# Patient Record
Sex: FEMALE | Race: WHITE | NOT HISPANIC OR LATINO | Employment: FULL TIME | URBAN - NONMETROPOLITAN AREA
[De-identification: names, ages, dates, MRNs, and addresses within clinical notes are randomized per-mention and may not be internally consistent; named-entity substitution may affect disease eponyms.]

---

## 2021-09-03 ENCOUNTER — HOSPITAL ENCOUNTER (EMERGENCY)
Facility: HOSPITAL | Age: 26
Discharge: HOME | End: 2021-09-03
Payer: COMMERCIAL

## 2021-09-03 VITALS
SYSTOLIC BLOOD PRESSURE: 150 MMHG | DIASTOLIC BLOOD PRESSURE: 89 MMHG | RESPIRATION RATE: 20 BRPM | HEIGHT: 67 IN | TEMPERATURE: 102 F | HEART RATE: 129 BPM | WEIGHT: 240 LBS | BODY MASS INDEX: 37.67 KG/M2 | OXYGEN SATURATION: 99 %

## 2021-09-03 DIAGNOSIS — Z20.822 CONTACT WITH AND (SUSPECTED) EXPOSURE TO COVID-19: ICD-10-CM

## 2021-09-03 DIAGNOSIS — R50.9 FEVER, UNSPECIFIED FEVER CAUSE: ICD-10-CM

## 2021-09-03 DIAGNOSIS — R06.02 SHORTNESS OF BREATH: Primary | ICD-10-CM

## 2021-09-03 PROCEDURE — 25000003 PHARM REV CODE 250: Performed by: NURSE PRACTITIONER

## 2021-09-03 PROCEDURE — 99282 EMERGENCY DEPT VISIT SF MDM: CPT | Mod: ,,, | Performed by: NURSE PRACTITIONER

## 2021-09-03 PROCEDURE — 99282 EMERGENCY DEPT VISIT SF MDM: CPT

## 2021-09-03 PROCEDURE — 99282 PR EMERGENCY DEPT VISIT,LEVEL II: ICD-10-PCS | Mod: ,,, | Performed by: NURSE PRACTITIONER

## 2021-09-03 RX ORDER — TRIPROLIDINE/PSEUDOEPHEDRINE 2.5MG-60MG
600 TABLET ORAL
Status: COMPLETED | OUTPATIENT
Start: 2021-09-03 | End: 2021-09-03

## 2021-09-03 RX ADMIN — IBUPROFEN 600 MG: 100 SUSPENSION ORAL at 06:09

## 2021-09-04 ENCOUNTER — TELEPHONE (OUTPATIENT)
Dept: EMERGENCY MEDICINE | Facility: HOSPITAL | Age: 26
End: 2021-09-04

## 2021-09-04 ENCOUNTER — INFUSION (OUTPATIENT)
Dept: INFECTIOUS DISEASES | Facility: HOSPITAL | Age: 26
End: 2021-09-04
Attending: NURSE PRACTITIONER
Payer: COMMERCIAL

## 2021-09-04 VITALS
SYSTOLIC BLOOD PRESSURE: 110 MMHG | OXYGEN SATURATION: 97 % | HEIGHT: 67 IN | HEART RATE: 101 BPM | TEMPERATURE: 99 F | RESPIRATION RATE: 18 BRPM | WEIGHT: 240 LBS | DIASTOLIC BLOOD PRESSURE: 80 MMHG | BODY MASS INDEX: 37.67 KG/M2

## 2021-09-04 DIAGNOSIS — U07.1 COVID-19: Primary | ICD-10-CM

## 2021-09-04 PROCEDURE — M0243 CASIRIVI AND IMDEVI INFUSION: HCPCS | Performed by: NURSE PRACTITIONER

## 2021-09-04 PROCEDURE — 25000003 PHARM REV CODE 250: Performed by: NURSE PRACTITIONER

## 2021-09-04 PROCEDURE — 63600175 PHARM REV CODE 636 W HCPCS: Performed by: NURSE PRACTITIONER

## 2021-09-04 RX ORDER — DIPHENHYDRAMINE HYDROCHLORIDE 50 MG/ML
25 INJECTION INTRAMUSCULAR; INTRAVENOUS ONCE AS NEEDED
Status: DISCONTINUED | OUTPATIENT
Start: 2021-09-04 | End: 2022-10-31

## 2021-09-04 RX ORDER — ONDANSETRON 4 MG/1
4 TABLET, ORALLY DISINTEGRATING ORAL ONCE AS NEEDED
Status: DISCONTINUED | OUTPATIENT
Start: 2021-09-04 | End: 2023-09-21

## 2021-09-04 RX ORDER — EPINEPHRINE 0.3 MG/.3ML
0.3 INJECTION SUBCUTANEOUS
Status: DISCONTINUED | OUTPATIENT
Start: 2021-09-04 | End: 2023-09-21

## 2021-09-04 RX ORDER — ACETAMINOPHEN 325 MG/1
650 TABLET ORAL ONCE AS NEEDED
Status: DISCONTINUED | OUTPATIENT
Start: 2021-09-04 | End: 2023-09-21

## 2021-09-04 RX ORDER — ALBUTEROL SULFATE 90 UG/1
2 AEROSOL, METERED RESPIRATORY (INHALATION)
Status: DISCONTINUED | OUTPATIENT
Start: 2021-09-04 | End: 2023-09-21

## 2021-09-04 RX ORDER — SODIUM CHLORIDE 0.9 % (FLUSH) 0.9 %
10 SYRINGE (ML) INJECTION
Status: DISCONTINUED | OUTPATIENT
Start: 2021-09-04 | End: 2023-09-21

## 2021-09-04 RX ADMIN — SODIUM CHLORIDE 600 MG: 9 INJECTION, SOLUTION INTRAVENOUS at 06:09

## 2021-09-07 ENCOUNTER — TELEPHONE (OUTPATIENT)
Dept: INFECTIOUS DISEASES | Facility: HOSPITAL | Age: 26
End: 2021-09-07

## 2022-09-09 ENCOUNTER — OFFICE VISIT (OUTPATIENT)
Dept: PRIMARY CARE CLINIC | Facility: CLINIC | Age: 27
End: 2022-09-09
Payer: COMMERCIAL

## 2022-09-09 VITALS
BODY MASS INDEX: 38.19 KG/M2 | RESPIRATION RATE: 18 BRPM | SYSTOLIC BLOOD PRESSURE: 127 MMHG | HEIGHT: 68 IN | TEMPERATURE: 98 F | WEIGHT: 252 LBS | DIASTOLIC BLOOD PRESSURE: 92 MMHG | OXYGEN SATURATION: 100 % | HEART RATE: 92 BPM

## 2022-09-09 DIAGNOSIS — F32.A ANXIETY AND DEPRESSION: Primary | ICD-10-CM

## 2022-09-09 DIAGNOSIS — F99 INSOMNIA DUE TO OTHER MENTAL DISORDER: ICD-10-CM

## 2022-09-09 DIAGNOSIS — F51.05 INSOMNIA DUE TO OTHER MENTAL DISORDER: ICD-10-CM

## 2022-09-09 DIAGNOSIS — F41.9 ANXIETY AND DEPRESSION: Primary | ICD-10-CM

## 2022-09-09 DIAGNOSIS — G90.A POTS (POSTURAL ORTHOSTATIC TACHYCARDIA SYNDROME): ICD-10-CM

## 2022-09-09 DIAGNOSIS — G43.909 MIGRAINE WITHOUT STATUS MIGRAINOSUS, NOT INTRACTABLE, UNSPECIFIED MIGRAINE TYPE: ICD-10-CM

## 2022-09-09 PROBLEM — Q79.62 EHLERS-DANLOS SYNDROME TYPE III: Status: ACTIVE | Noted: 2020-01-06

## 2022-09-09 LAB
T4 FREE SERPL-MCNC: 1.11 NG/DL (ref 0.76–1.46)
TSH SERPL DL<=0.005 MIU/L-ACNC: 2.19 UIU/ML (ref 0.36–3.74)

## 2022-09-09 PROCEDURE — 99214 PR OFFICE/OUTPT VISIT, EST, LEVL IV, 30-39 MIN: ICD-10-PCS | Mod: ,,, | Performed by: STUDENT IN AN ORGANIZED HEALTH CARE EDUCATION/TRAINING PROGRAM

## 2022-09-09 PROCEDURE — 3080F DIAST BP >= 90 MM HG: CPT | Mod: ,,, | Performed by: STUDENT IN AN ORGANIZED HEALTH CARE EDUCATION/TRAINING PROGRAM

## 2022-09-09 PROCEDURE — 84443 ASSAY THYROID STIM HORMONE: CPT | Mod: ,,, | Performed by: CLINICAL MEDICAL LABORATORY

## 2022-09-09 PROCEDURE — 1160F RVW MEDS BY RX/DR IN RCRD: CPT | Mod: ,,, | Performed by: STUDENT IN AN ORGANIZED HEALTH CARE EDUCATION/TRAINING PROGRAM

## 2022-09-09 PROCEDURE — 3074F PR MOST RECENT SYSTOLIC BLOOD PRESSURE < 130 MM HG: ICD-10-PCS | Mod: ,,, | Performed by: STUDENT IN AN ORGANIZED HEALTH CARE EDUCATION/TRAINING PROGRAM

## 2022-09-09 PROCEDURE — 84443 TSH: ICD-10-PCS | Mod: ,,, | Performed by: CLINICAL MEDICAL LABORATORY

## 2022-09-09 PROCEDURE — 84439 T4, FREE: ICD-10-PCS | Mod: ,,, | Performed by: CLINICAL MEDICAL LABORATORY

## 2022-09-09 PROCEDURE — 3008F PR BODY MASS INDEX (BMI) DOCUMENTED: ICD-10-PCS | Mod: ,,, | Performed by: STUDENT IN AN ORGANIZED HEALTH CARE EDUCATION/TRAINING PROGRAM

## 2022-09-09 PROCEDURE — 1159F MED LIST DOCD IN RCRD: CPT | Mod: ,,, | Performed by: STUDENT IN AN ORGANIZED HEALTH CARE EDUCATION/TRAINING PROGRAM

## 2022-09-09 PROCEDURE — 99214 OFFICE O/P EST MOD 30 MIN: CPT | Mod: ,,, | Performed by: STUDENT IN AN ORGANIZED HEALTH CARE EDUCATION/TRAINING PROGRAM

## 2022-09-09 PROCEDURE — 3080F PR MOST RECENT DIASTOLIC BLOOD PRESSURE >= 90 MM HG: ICD-10-PCS | Mod: ,,, | Performed by: STUDENT IN AN ORGANIZED HEALTH CARE EDUCATION/TRAINING PROGRAM

## 2022-09-09 PROCEDURE — 3008F BODY MASS INDEX DOCD: CPT | Mod: ,,, | Performed by: STUDENT IN AN ORGANIZED HEALTH CARE EDUCATION/TRAINING PROGRAM

## 2022-09-09 PROCEDURE — 84439 ASSAY OF FREE THYROXINE: CPT | Mod: ,,, | Performed by: CLINICAL MEDICAL LABORATORY

## 2022-09-09 PROCEDURE — 3074F SYST BP LT 130 MM HG: CPT | Mod: ,,, | Performed by: STUDENT IN AN ORGANIZED HEALTH CARE EDUCATION/TRAINING PROGRAM

## 2022-09-09 PROCEDURE — 1160F PR REVIEW ALL MEDS BY PRESCRIBER/CLIN PHARMACIST DOCUMENTED: ICD-10-PCS | Mod: ,,, | Performed by: STUDENT IN AN ORGANIZED HEALTH CARE EDUCATION/TRAINING PROGRAM

## 2022-09-09 PROCEDURE — 1159F PR MEDICATION LIST DOCUMENTED IN MEDICAL RECORD: ICD-10-PCS | Mod: ,,, | Performed by: STUDENT IN AN ORGANIZED HEALTH CARE EDUCATION/TRAINING PROGRAM

## 2022-09-09 RX ORDER — SERTRALINE HYDROCHLORIDE 25 MG/1
25 TABLET, FILM COATED ORAL DAILY
Qty: 30 TABLET | Refills: 1 | Status: SHIPPED | OUTPATIENT
Start: 2022-09-09 | End: 2022-09-29

## 2022-09-09 RX ORDER — PROPRANOLOL HYDROCHLORIDE 20 MG/1
20 TABLET ORAL DAILY
Qty: 30 TABLET | Refills: 1 | Status: SHIPPED | OUTPATIENT
Start: 2022-09-09 | End: 2022-11-21 | Stop reason: SDUPTHER

## 2022-09-09 RX ORDER — SERTRALINE HYDROCHLORIDE 25 MG/1
25 TABLET, FILM COATED ORAL DAILY
Qty: 30 TABLET | Refills: 0 | Status: SHIPPED | OUTPATIENT
Start: 2022-09-09 | End: 2022-09-09

## 2022-09-09 RX ORDER — TOPIRAMATE 25 MG/1
75 TABLET ORAL 2 TIMES DAILY
Qty: 540 TABLET | Refills: 1 | Status: SHIPPED | OUTPATIENT
Start: 2022-09-09 | End: 2023-04-26

## 2022-09-09 RX ORDER — TRAZODONE HYDROCHLORIDE 50 MG/1
50 TABLET ORAL NIGHTLY PRN
Qty: 30 TABLET | Refills: 1 | Status: SHIPPED | OUTPATIENT
Start: 2022-09-09 | End: 2022-09-29

## 2022-09-09 NOTE — PROGRESS NOTES
"Subjective:      Zora Becerra is a 26 y.o.female who presents to clinic for Anxiety, Depression, Migraine, and Insomnia    Patient has been dealing with anxiety and depression since she was 14 years and again when she was 24. Her most recent stressor was her parents . She also has terrible "medical anxiety" with regards to her partner. She states that she panics and jumps to worst case scenario. 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 She used to see a Psychiatrist about 3-4 years ago. She is concerned because it is affected her sleep, work, and relationship. She was unable to continue to see her psychiatrist because of financial problems. She states she is going to have to file for bankruptcy.     She also has problems with insomnia. She states she uses bilateral hearing aids and when she takes it out at night, she gets very anxious that something is happening, but she can't hear what's going on. She has problems with both sleep onset and sleep maintenance. She states sometimes she will be awake from 11pm/12pm to 5am. She took melatonin and benadryl without relief last night. Denies SI/HI/AVH.     Per chart review and patient report: she has been on buspar as needed, zoloft (used to work, but she took it last week and felt like she was jumping out of her skin), topamax, doxepin, lexapro (felt like she was having a seizure), klonipin, and melatonin for her mood and insomnia. She is also complaining of headaches, decreased energy, and excess hair loss.     She has a history of migraines for which she was on topamax as well as seizures that she was on keppra for. She self discontinued due to finances and because she said they did an EEG and they didn't see any seizure activity. Has family history of epilepsy.     She also has a history of POTS that was treated with propranolol 20mg daily. Last time she saw " Cardiology was in 2020.       EMILY-7 anxiety scale    Not at all Several days More than half the days Nearly every day   Over the last 2 weeks, how often have you been bothered by the following problems?   1. Feeling nervous, anxious, or on edge 0 1 2 3   2. Not being able to stop or control worrying 0 1 2 3   3. Worrying too much about different things 0 1 2 3   4. Trouble relaxing 0 1 2 3   5. Being so restless that it is hard to sit still 0 1 2 3   6. Becoming easily annoyed or irritable 0 1 2 3   7. Feeling afraid as if something awful might happen 0 1 2 3     Score 19 - severe anxiety    PHQ-9 depression questionnaire  Over the last 2 weeks, how often have you been bothered by any of the following problems? Not at all Several days More than half the days Nearly every day   Little interest or pleasure in doing things 0 1 2 3   Feeling down, depressed, or hopeless 0 1 2 3   Trouble falling or staying asleep, or sleeping too much 0 1 2 3   Feeling tired or having little energy 0 1 2 3   Poor appetite or overeating 0 1 2 3   Feeling bad about yourself, or that you are a failure, or that you have let yourself or your family down 0 1 2 3   Trouble concentrating on things, such as reading the newspaper or watching television 0 1 2 3   Moving or speaking so slowly that other people could have noticed? Or the opposite, being so fidgety or restless that you have been moving around a lot more than usual. 0 1 2 3   Thoughts that you would be better off dead, or of hurting yourself in some way 0 1 2 3     PHQ9 19 - moderately severe      ROS as above    Past Medical History:  has a past medical history of Asthma and Pott's disease.   Past Surgical History:  has a past surgical history that includes Cholecystectomy and Tonsillectomy.  Family History: family history is not on file.  Social History:  reports that she has never smoked. She has never used smokeless tobacco. She reports that she does not drink alcohol and does not  use drugs.  Allergies:   Review of patient's allergies indicates:   Allergen Reactions    Dexamethasone Other (See Comments)     HR > 200      Latex     Penicillins Rash       Objective:     Vitals:    09/09/22 1011   BP: (!) 127/92   Pulse: 92   Resp: 18   Temp: 98.2 °F (36.8 °C)     Physical Exam  Vitals reviewed.   Constitutional:       General: She is not in acute distress.     Appearance: Normal appearance. She is not ill-appearing, toxic-appearing or diaphoretic.   HENT:      Head: Normocephalic and atraumatic.      Right Ear: External ear normal.      Left Ear: External ear normal.   Eyes:      General: No scleral icterus.        Right eye: No discharge.         Left eye: No discharge.      Conjunctiva/sclera: Conjunctivae normal.   Cardiovascular:      Rate and Rhythm: Normal rate and regular rhythm.      Pulses: Normal pulses.   Pulmonary:      Effort: Pulmonary effort is normal. No respiratory distress.      Breath sounds: Normal breath sounds. No wheezing.   Neurological:      General: No focal deficit present.      Mental Status: She is alert.   Psychiatric:         Attention and Perception: Attention normal.         Speech: Speech normal.         Behavior: Behavior normal.         Thought Content: Thought content normal.        Assessment/Plan:     1. Anxiety and depression  - will r/o thyroid dysfunction due to anxiety, depression, and excess hair loss  - restart zoloft daily  - TSH; Future  - T4, Free; Future  - sertraline (ZOLOFT) 25 MG tablet; Take 1 tablet (25 mg total) by mouth once daily.  Dispense: 30 tablet; Refill: 1  - TSH  - T4, Free    2. Migraine without status migrainosus, not intractable, unspecified migraine type  - restart topamax  - topiramate (TOPAMAX) 25 MG tablet; Take 3 tablets (75 mg total) by mouth 2 (two) times daily.  Dispense: 540 tablet; Refill: 1    3. Insomnia due to other mental disorder  - short trial of trazodone for insomnia  - traZODone (DESYREL) 50 MG tablet; Take 1  tablet (50 mg total) by mouth nightly as needed for Insomnia.  Dispense: 30 tablet; Refill: 1    4. POTS (postural orthostatic tachycardia syndrome)  - restart propranolol  - propranoloL (INDERAL) 20 MG tablet; Take 1 tablet (20 mg total) by mouth once daily.  Dispense: 30 tablet; Refill: 1        Return to clinic in 1 month for f/u mood or sooner if needed.     Zoey Driscoll MD  Family Medicine  09/09/2022

## 2022-09-29 ENCOUNTER — OFFICE VISIT (OUTPATIENT)
Dept: PRIMARY CARE CLINIC | Facility: CLINIC | Age: 27
End: 2022-09-29
Payer: COMMERCIAL

## 2022-09-29 VITALS
HEIGHT: 68 IN | RESPIRATION RATE: 18 BRPM | OXYGEN SATURATION: 100 % | WEIGHT: 245 LBS | HEART RATE: 70 BPM | SYSTOLIC BLOOD PRESSURE: 122 MMHG | BODY MASS INDEX: 37.13 KG/M2 | DIASTOLIC BLOOD PRESSURE: 86 MMHG

## 2022-09-29 DIAGNOSIS — G47.00 INSOMNIA, UNSPECIFIED TYPE: ICD-10-CM

## 2022-09-29 DIAGNOSIS — F32.A ANXIETY AND DEPRESSION: Primary | ICD-10-CM

## 2022-09-29 DIAGNOSIS — F41.9 ANXIETY AND DEPRESSION: Primary | ICD-10-CM

## 2022-09-29 PROCEDURE — 3008F BODY MASS INDEX DOCD: CPT | Mod: ,,, | Performed by: STUDENT IN AN ORGANIZED HEALTH CARE EDUCATION/TRAINING PROGRAM

## 2022-09-29 PROCEDURE — 3008F PR BODY MASS INDEX (BMI) DOCUMENTED: ICD-10-PCS | Mod: ,,, | Performed by: STUDENT IN AN ORGANIZED HEALTH CARE EDUCATION/TRAINING PROGRAM

## 2022-09-29 PROCEDURE — 1159F MED LIST DOCD IN RCRD: CPT | Mod: ,,, | Performed by: STUDENT IN AN ORGANIZED HEALTH CARE EDUCATION/TRAINING PROGRAM

## 2022-09-29 PROCEDURE — 99214 PR OFFICE/OUTPT VISIT, EST, LEVL IV, 30-39 MIN: ICD-10-PCS | Mod: ,,, | Performed by: STUDENT IN AN ORGANIZED HEALTH CARE EDUCATION/TRAINING PROGRAM

## 2022-09-29 PROCEDURE — 99214 OFFICE O/P EST MOD 30 MIN: CPT | Mod: ,,, | Performed by: STUDENT IN AN ORGANIZED HEALTH CARE EDUCATION/TRAINING PROGRAM

## 2022-09-29 PROCEDURE — 3074F PR MOST RECENT SYSTOLIC BLOOD PRESSURE < 130 MM HG: ICD-10-PCS | Mod: ,,, | Performed by: STUDENT IN AN ORGANIZED HEALTH CARE EDUCATION/TRAINING PROGRAM

## 2022-09-29 PROCEDURE — 3079F PR MOST RECENT DIASTOLIC BLOOD PRESSURE 80-89 MM HG: ICD-10-PCS | Mod: ,,, | Performed by: STUDENT IN AN ORGANIZED HEALTH CARE EDUCATION/TRAINING PROGRAM

## 2022-09-29 PROCEDURE — 3079F DIAST BP 80-89 MM HG: CPT | Mod: ,,, | Performed by: STUDENT IN AN ORGANIZED HEALTH CARE EDUCATION/TRAINING PROGRAM

## 2022-09-29 PROCEDURE — 1159F PR MEDICATION LIST DOCUMENTED IN MEDICAL RECORD: ICD-10-PCS | Mod: ,,, | Performed by: STUDENT IN AN ORGANIZED HEALTH CARE EDUCATION/TRAINING PROGRAM

## 2022-09-29 PROCEDURE — 3074F SYST BP LT 130 MM HG: CPT | Mod: ,,, | Performed by: STUDENT IN AN ORGANIZED HEALTH CARE EDUCATION/TRAINING PROGRAM

## 2022-09-29 RX ORDER — DOXEPIN HYDROCHLORIDE 10 MG/1
10 CAPSULE ORAL NIGHTLY
Qty: 30 CAPSULE | Refills: 1 | Status: SHIPPED | OUTPATIENT
Start: 2022-09-29 | End: 2023-04-26

## 2022-09-29 RX ORDER — SERTRALINE HYDROCHLORIDE 50 MG/1
50 TABLET, FILM COATED ORAL DAILY
Qty: 30 TABLET | Refills: 1 | Status: SHIPPED | OUTPATIENT
Start: 2022-09-29 | End: 2023-04-26

## 2022-09-29 NOTE — PROGRESS NOTES
Subjective:      Zora Becerra is a 26 y.o.female who presents to clinic for Follow-up      Patient presents for f/u anxiety and depression. She states her mood is better, but she is still feeling tired. She doesn't have the get up and go. She used to wake up and want to do her hair and makeup, but she doesn't want to do that anymore. She has backed off of the trazodone and only takes it when she feels like she can't fall asleep. She said she can fall asleep easier now, but can't stay asleep and doesn't feel rested when she wakes up. She wakes up multiple times throughout the night. She says that she only gets 4-5 hours of sleep though she goes to bed around 9/10pm and wakes up at 6am. Periods are irregular and heavy (she states that she bled through 3 pads in 4 hours once). LMP: sometime last week, but only lasted about 4 days. Denies SI/HI/AVH. She states her relationship with her significant other is doing better since she is starting to feel less anxious about things.     EMILY-7 anxiety scale    Not at all Several days More than half the days Nearly every day   Over the last 2 weeks, how often have you been bothered by the following problems?   1. Feeling nervous, anxious, or on edge 0 1 2 3   2. Not being able to stop or control worrying 0 1 2 3   3. Worrying too much about different things 0 1 2 3   4. Trouble relaxing 0 1 2 3   5. Being so restless that it is hard to sit still 0 1 2 3   6. Becoming easily annoyed or irritable 0 1 2 3   7. Feeling afraid as if something awful might happen 0 1 2 3     Score 13    PHQ-9 depression questionnaire  Over the last 2 weeks, how often have you been bothered by any of the following problems? Not at all Several days More than half the days Nearly every day   Little interest or pleasure in doing things 0 1 2 3   Feeling down, depressed, or hopeless 0 1 2 3   Trouble falling or staying asleep, or sleeping too much 0 1 2 3   Feeling tired or having little energy 0 1 2 3    Poor appetite or overeating 0 1 2 3   Feeling bad about yourself, or that you are a failure, or that you have let yourself or your family down 0 1 2 3   Trouble concentrating on things, such as reading the newspaper or watching television 0 1 2 3   Moving or speaking so slowly that other people could have noticed? Or the opposite, being so fidgety or restless that you have been moving around a lot more than usual. 0 1 2 3   Thoughts that you would be better off dead, or of hurting yourself in some way 0 1 2 3     Score 16      ROS as above    Past Medical History:  has a past medical history of Asthma and Pott's disease.   Past Surgical History:  has a past surgical history that includes Cholecystectomy and Tonsillectomy.  Family History: family history is not on file.  Social History:  reports that she has never smoked. She has never used smokeless tobacco. She reports that she does not drink alcohol and does not use drugs.  Allergies:   Review of patient's allergies indicates:   Allergen Reactions    Dexamethasone Other (See Comments)     HR > 200      Latex     Penicillins Rash       Objective:     Vitals:    09/29/22 1608   BP: 122/86   Pulse: 70   Resp: 18     Physical Exam  Vitals reviewed.   Constitutional:       General: She is not in acute distress.     Appearance: Normal appearance. She is not ill-appearing, toxic-appearing or diaphoretic.   HENT:      Head: Normocephalic and atraumatic.      Right Ear: External ear normal.      Left Ear: External ear normal.   Eyes:      General: No scleral icterus.        Right eye: No discharge.         Left eye: No discharge.   Pulmonary:      Effort: Pulmonary effort is normal. No respiratory distress.   Neurological:      Mental Status: She is alert.   Psychiatric:         Attention and Perception: Attention normal.         Speech: Speech normal.         Behavior: Behavior normal.         Thought Content: Thought content normal.        Assessment/Plan:     1.  Anxiety and depression  - PHQ9 and GAD7 have improved from a score of 19 at our last visit  - increase zoloft to 50mg daily  - sertraline (ZOLOFT) 50 MG tablet; Take 1 tablet (50 mg total) by mouth once daily.  Dispense: 30 tablet; Refill: 1    2. Insomnia, unspecified type  - discontinue trazodone and will do trial of doxepin for sleep maintenance insomnia  - doxepin (SINEQUAN) 10 MG capsule; Take 1 capsule (10 mg total) by mouth every evening.  Dispense: 30 capsule; Refill: 1      Return to clinic in 1 month for f/u mood or sooner if needed.     Zoey Driscoll MD  Family Medicine  09/29/2022

## 2022-10-31 ENCOUNTER — OFFICE VISIT (OUTPATIENT)
Dept: PRIMARY CARE CLINIC | Facility: CLINIC | Age: 27
End: 2022-10-31
Payer: COMMERCIAL

## 2022-10-31 VITALS
RESPIRATION RATE: 18 BRPM | HEART RATE: 87 BPM | BODY MASS INDEX: 38.04 KG/M2 | DIASTOLIC BLOOD PRESSURE: 103 MMHG | WEIGHT: 251 LBS | SYSTOLIC BLOOD PRESSURE: 140 MMHG | TEMPERATURE: 99 F | OXYGEN SATURATION: 96 % | HEIGHT: 68 IN

## 2022-10-31 DIAGNOSIS — J01.90 ACUTE NON-RECURRENT SINUSITIS, UNSPECIFIED LOCATION: Primary | ICD-10-CM

## 2022-10-31 LAB
CTP QC/QA: YES
CTP QC/QA: YES
FLUAV AG NPH QL: NEGATIVE
FLUBV AG NPH QL: NEGATIVE
S PYO RRNA THROAT QL PROBE: NEGATIVE
SARS-COV-2 AG RESP QL IA.RAPID: NEGATIVE

## 2022-10-31 PROCEDURE — 99213 OFFICE O/P EST LOW 20 MIN: CPT | Mod: 25,,, | Performed by: STUDENT IN AN ORGANIZED HEALTH CARE EDUCATION/TRAINING PROGRAM

## 2022-10-31 PROCEDURE — 99213 PR OFFICE/OUTPT VISIT, EST, LEVL III, 20-29 MIN: ICD-10-PCS | Mod: 25,,, | Performed by: STUDENT IN AN ORGANIZED HEALTH CARE EDUCATION/TRAINING PROGRAM

## 2022-10-31 PROCEDURE — 1159F PR MEDICATION LIST DOCUMENTED IN MEDICAL RECORD: ICD-10-PCS | Mod: ,,, | Performed by: STUDENT IN AN ORGANIZED HEALTH CARE EDUCATION/TRAINING PROGRAM

## 2022-10-31 PROCEDURE — 87880 STREP A ASSAY W/OPTIC: CPT | Mod: QW,,, | Performed by: STUDENT IN AN ORGANIZED HEALTH CARE EDUCATION/TRAINING PROGRAM

## 2022-10-31 PROCEDURE — 1159F MED LIST DOCD IN RCRD: CPT | Mod: ,,, | Performed by: STUDENT IN AN ORGANIZED HEALTH CARE EDUCATION/TRAINING PROGRAM

## 2022-10-31 PROCEDURE — 3077F SYST BP >= 140 MM HG: CPT | Mod: ,,, | Performed by: STUDENT IN AN ORGANIZED HEALTH CARE EDUCATION/TRAINING PROGRAM

## 2022-10-31 PROCEDURE — 87428 POCT SARS-COV2 (COVID) WITH FLU ANTIGEN: ICD-10-PCS | Mod: QW,,, | Performed by: STUDENT IN AN ORGANIZED HEALTH CARE EDUCATION/TRAINING PROGRAM

## 2022-10-31 PROCEDURE — 87880 POCT RAPID STREP A: ICD-10-PCS | Mod: QW,,, | Performed by: STUDENT IN AN ORGANIZED HEALTH CARE EDUCATION/TRAINING PROGRAM

## 2022-10-31 PROCEDURE — 96372 PR INJECTION,THERAP/PROPH/DIAG2ST, IM OR SUBCUT: ICD-10-PCS | Mod: ,,, | Performed by: STUDENT IN AN ORGANIZED HEALTH CARE EDUCATION/TRAINING PROGRAM

## 2022-10-31 PROCEDURE — 96372 THER/PROPH/DIAG INJ SC/IM: CPT | Mod: ,,, | Performed by: STUDENT IN AN ORGANIZED HEALTH CARE EDUCATION/TRAINING PROGRAM

## 2022-10-31 PROCEDURE — 3077F PR MOST RECENT SYSTOLIC BLOOD PRESSURE >= 140 MM HG: ICD-10-PCS | Mod: ,,, | Performed by: STUDENT IN AN ORGANIZED HEALTH CARE EDUCATION/TRAINING PROGRAM

## 2022-10-31 PROCEDURE — 3080F PR MOST RECENT DIASTOLIC BLOOD PRESSURE >= 90 MM HG: ICD-10-PCS | Mod: ,,, | Performed by: STUDENT IN AN ORGANIZED HEALTH CARE EDUCATION/TRAINING PROGRAM

## 2022-10-31 PROCEDURE — 3080F DIAST BP >= 90 MM HG: CPT | Mod: ,,, | Performed by: STUDENT IN AN ORGANIZED HEALTH CARE EDUCATION/TRAINING PROGRAM

## 2022-10-31 PROCEDURE — 87428 SARSCOV & INF VIR A&B AG IA: CPT | Mod: QW,,, | Performed by: STUDENT IN AN ORGANIZED HEALTH CARE EDUCATION/TRAINING PROGRAM

## 2022-10-31 RX ORDER — CEFTRIAXONE 1 G/1
1 INJECTION, POWDER, FOR SOLUTION INTRAMUSCULAR; INTRAVENOUS
Status: COMPLETED | OUTPATIENT
Start: 2022-10-31 | End: 2022-10-31

## 2022-10-31 RX ORDER — AMOXICILLIN AND CLAVULANATE POTASSIUM 875; 125 MG/1; MG/1
1 TABLET, FILM COATED ORAL EVERY 12 HOURS
Qty: 14 TABLET | Refills: 0 | Status: SHIPPED | OUTPATIENT
Start: 2022-11-01 | End: 2022-11-08

## 2022-10-31 RX ORDER — BENZONATATE 200 MG/1
200 CAPSULE ORAL 3 TIMES DAILY PRN
Qty: 30 CAPSULE | Refills: 0 | Status: SHIPPED | OUTPATIENT
Start: 2022-10-31 | End: 2022-11-10

## 2022-10-31 RX ADMIN — CEFTRIAXONE 1 G: 1 INJECTION, POWDER, FOR SOLUTION INTRAMUSCULAR; INTRAVENOUS at 04:10

## 2022-10-31 NOTE — PROGRESS NOTES
Subjective:      Zora Becerra is a 27 y.o.female who presents to clinic for Cough, Fever, Sore Throat, Nasal Congestion, and Generalized Body Aches    Symptoms began last night and include: fever (Tm 101.2F), sore throat, cough, nasal congestion, rhinorrhea, myalgias, malaise, and shortness of breath with exertion. She states a kid vomited on her on Friday and she started feeling puny over the weekend. She has been taking ibuprofen, tylenol, aleve, and dayquil for her symptoms.       ROS as above    Past Medical History:  has a past medical history of Asthma and Pott's disease.   Past Surgical History:  has a past surgical history that includes Cholecystectomy and Tonsillectomy.  Family History: family history is not on file.  Social History:  reports that she has never smoked. She has never used smokeless tobacco. She reports that she does not drink alcohol and does not use drugs.  Allergies:   Review of patient's allergies indicates:   Allergen Reactions    Dexamethasone Other (See Comments)     HR > 200      Latex     Penicillins Rash       Objective:     Vitals:    10/31/22 1555   BP: (!) 140/103   Pulse: 87   Resp: 18   Temp: 99.1 °F (37.3 °C)     Physical Exam  Vitals reviewed.   Constitutional:       General: She is not in acute distress.     Appearance: Normal appearance. She is ill-appearing. She is not toxic-appearing or diaphoretic.   HENT:      Head: Normocephalic and atraumatic.      Right Ear: Tympanic membrane, ear canal and external ear normal.      Left Ear: Tympanic membrane, ear canal and external ear normal.      Nose: Congestion present.      Right Turbinates: Enlarged and swollen.      Left Turbinates: Enlarged and swollen.      Right Sinus: Maxillary sinus tenderness and frontal sinus tenderness present.      Left Sinus: Maxillary sinus tenderness and frontal sinus tenderness present.      Mouth/Throat:      Mouth: Mucous membranes are moist.      Pharynx: Posterior oropharyngeal erythema  present. No oropharyngeal exudate.   Eyes:      General: No scleral icterus.        Right eye: No discharge.         Left eye: No discharge.   Cardiovascular:      Rate and Rhythm: Normal rate and regular rhythm.      Pulses: Normal pulses.      Heart sounds: Normal heart sounds. No murmur heard.  Pulmonary:      Effort: Pulmonary effort is normal. No respiratory distress.      Breath sounds: Normal breath sounds. No wheezing, rhonchi or rales.   Skin:     General: Skin is warm.   Neurological:      General: No focal deficit present.      Mental Status: She is alert.   Psychiatric:         Behavior: Behavior normal.          Assessment/Plan:     1. Acute non-recurrent sinusitis, unspecified location  - COVID, flu, and strep negative  - POCT SARS-COV2 (COVID) with Flu Antigen  - POCT rapid strep A  - cefTRIAXone injection 1 g  - amoxicillin-clavulanate 875-125mg (AUGMENTIN) 875-125 mg per tablet; Take 1 tablet by mouth every 12 (twelve) hours. for 7 days  Dispense: 14 tablet; Refill: 0  - chlorpheniramine-dextromethorp 1-7.5 mg/5 mL Liqd; Take 10 mLs by mouth every 6 (six) hours as needed (cough and congestion).  Dispense: 118 mL; Refill: 0  - benzonatate (TESSALON) 200 MG capsule; Take 1 capsule (200 mg total) by mouth 3 (three) times daily as needed for Cough.  Dispense: 30 capsule; Refill: 0        Return to clinic if symptoms persist/worsen and as needed.     Zoey Driscoll MD  Family Medicine  10/31/2022

## 2022-10-31 NOTE — LETTER
October 31, 2022      [facility]  [address] [facility] [place] [address]  Phone: [phone]  Fax: [phone]       Patient: Zora Becerra   YOB: 1995  Date of Visit: 10/31/2022    To Whom It May Concern:      Carlos Becerra  was at [facility] on 10/31/2022. The patient may return to work/school on 11/2/22 as long as she remains fever free without the use of fever reducing medications for 24 hours. If you have any questions or concerns, or if I can be of further assistance, please do not hesitate to contact me.      Sincerely,    Zoey Driscoll MD      Last seen: 06/10/2019  Last filled: 06/10/2019 Losartan HCT  Upcoming apt: none

## 2022-11-21 ENCOUNTER — OFFICE VISIT (OUTPATIENT)
Dept: PRIMARY CARE CLINIC | Facility: CLINIC | Age: 27
End: 2022-11-21
Payer: COMMERCIAL

## 2022-11-21 VITALS
HEART RATE: 86 BPM | TEMPERATURE: 98 F | BODY MASS INDEX: 44.83 KG/M2 | RESPIRATION RATE: 18 BRPM | WEIGHT: 253 LBS | DIASTOLIC BLOOD PRESSURE: 91 MMHG | HEIGHT: 63 IN | OXYGEN SATURATION: 98 % | SYSTOLIC BLOOD PRESSURE: 134 MMHG

## 2022-11-21 DIAGNOSIS — Z13.1 SCREENING FOR DIABETES MELLITUS: ICD-10-CM

## 2022-11-21 DIAGNOSIS — Z13.220 SCREENING FOR LIPOID DISORDERS: ICD-10-CM

## 2022-11-21 DIAGNOSIS — G90.A POTS (POSTURAL ORTHOSTATIC TACHYCARDIA SYNDROME): ICD-10-CM

## 2022-11-21 DIAGNOSIS — Z00.01 ENCOUNTER FOR ROUTINE ADULT PHYSICAL EXAM WITH ABNORMAL FINDINGS: Primary | ICD-10-CM

## 2022-11-21 LAB
CHOLEST SERPL-MCNC: 176 MG/DL (ref 0–200)
CHOLEST/HDLC SERPL: 3.3 {RATIO}
GLUCOSE SERPL-MCNC: 85 MG/DL (ref 74–106)
HDLC SERPL-MCNC: 53 MG/DL (ref 40–60)
LDLC SERPL CALC-MCNC: 102 MG/DL
LDLC/HDLC SERPL: 1.9 {RATIO}
NONHDLC SERPL-MCNC: 123 MG/DL
TRIGL SERPL-MCNC: 103 MG/DL (ref 35–150)
VLDLC SERPL-MCNC: 21 MG/DL

## 2022-11-21 PROCEDURE — 3080F PR MOST RECENT DIASTOLIC BLOOD PRESSURE >= 90 MM HG: ICD-10-PCS | Mod: ,,, | Performed by: STUDENT IN AN ORGANIZED HEALTH CARE EDUCATION/TRAINING PROGRAM

## 2022-11-21 PROCEDURE — 82947 GLUCOSE, FASTING: ICD-10-PCS | Mod: ,,, | Performed by: CLINICAL MEDICAL LABORATORY

## 2022-11-21 PROCEDURE — 99395 PREV VISIT EST AGE 18-39: CPT | Mod: ,,, | Performed by: STUDENT IN AN ORGANIZED HEALTH CARE EDUCATION/TRAINING PROGRAM

## 2022-11-21 PROCEDURE — 80061 LIPID PANEL: ICD-10-PCS | Mod: ,,, | Performed by: CLINICAL MEDICAL LABORATORY

## 2022-11-21 PROCEDURE — 3008F PR BODY MASS INDEX (BMI) DOCUMENTED: ICD-10-PCS | Mod: ,,, | Performed by: STUDENT IN AN ORGANIZED HEALTH CARE EDUCATION/TRAINING PROGRAM

## 2022-11-21 PROCEDURE — 3008F BODY MASS INDEX DOCD: CPT | Mod: ,,, | Performed by: STUDENT IN AN ORGANIZED HEALTH CARE EDUCATION/TRAINING PROGRAM

## 2022-11-21 PROCEDURE — 80061 LIPID PANEL: CPT | Mod: ,,, | Performed by: CLINICAL MEDICAL LABORATORY

## 2022-11-21 PROCEDURE — 1159F PR MEDICATION LIST DOCUMENTED IN MEDICAL RECORD: ICD-10-PCS | Mod: ,,, | Performed by: STUDENT IN AN ORGANIZED HEALTH CARE EDUCATION/TRAINING PROGRAM

## 2022-11-21 PROCEDURE — 1159F MED LIST DOCD IN RCRD: CPT | Mod: ,,, | Performed by: STUDENT IN AN ORGANIZED HEALTH CARE EDUCATION/TRAINING PROGRAM

## 2022-11-21 PROCEDURE — 3075F PR MOST RECENT SYSTOLIC BLOOD PRESS GE 130-139MM HG: ICD-10-PCS | Mod: ,,, | Performed by: STUDENT IN AN ORGANIZED HEALTH CARE EDUCATION/TRAINING PROGRAM

## 2022-11-21 PROCEDURE — 82947 ASSAY GLUCOSE BLOOD QUANT: CPT | Mod: ,,, | Performed by: CLINICAL MEDICAL LABORATORY

## 2022-11-21 PROCEDURE — 83036 HEMOGLOBIN GLYCOSYLATED A1C: CPT | Mod: ,,, | Performed by: CLINICAL MEDICAL LABORATORY

## 2022-11-21 PROCEDURE — 3080F DIAST BP >= 90 MM HG: CPT | Mod: ,,, | Performed by: STUDENT IN AN ORGANIZED HEALTH CARE EDUCATION/TRAINING PROGRAM

## 2022-11-21 PROCEDURE — 99395 PR PREVENTIVE VISIT,EST,18-39: ICD-10-PCS | Mod: ,,, | Performed by: STUDENT IN AN ORGANIZED HEALTH CARE EDUCATION/TRAINING PROGRAM

## 2022-11-21 PROCEDURE — 3075F SYST BP GE 130 - 139MM HG: CPT | Mod: ,,, | Performed by: STUDENT IN AN ORGANIZED HEALTH CARE EDUCATION/TRAINING PROGRAM

## 2022-11-21 PROCEDURE — 83036 HEMOGLOBIN A1C: ICD-10-PCS | Mod: ,,, | Performed by: CLINICAL MEDICAL LABORATORY

## 2022-11-21 RX ORDER — PROPRANOLOL HYDROCHLORIDE 20 MG/1
20 TABLET ORAL 2 TIMES DAILY
Qty: 180 TABLET | Refills: 0 | Status: SHIPPED | OUTPATIENT
Start: 2022-11-21 | End: 2023-09-21

## 2022-11-21 NOTE — PROGRESS NOTES
Subjective     Patient ID: Zora Becerra is a 27 y.o. female.    Chief Complaint: Healthy You    She is complaining of continued headaches and elevated blood pressure despite being on propranolol and topamax. She is only on the BB once daily.     Immunizations:  Influenza vaccine: declines  Tetanus and diptheria booster: declines  COVID-19: declines    Screenings:  Last cervical cancer screening: a long time ago in [place] ([facility])  PHQ-2 score: 2 - has hx of depression currently on zoloft  Last dental exam: awhile ago  Last eye exam: a few years ago    Social History:  Tobacco use: none  Alcohol use: occasional  Illicit drug use: denies    Housing:  Patient lives in a [facility]. Patient does live in a safe neighborhood. Patient does feel safe at home. patient lives alone.     Sexual History:  Sexually active: yes  Consistent condom use: yes  STI screening: declines  LMP: last week    Diet/Exercise:  Patient's diet consists of: meats, fruits, vegetables, and dairy  Exercise:  walks daily    Review of Systems   Constitutional:  Negative for chills and fever.   Respiratory:  Positive for cough and shortness of breath.    Cardiovascular:  Positive for palpitations.   Genitourinary:  Negative for dysuria and hematuria.   Neurological:  Negative for dizziness and headaches.     Tobacco Use: Low Risk     Smoking Tobacco Use: Never    Smokeless Tobacco Use: Never    Passive Exposure: Not on file     Review of patient's allergies indicates:   Allergen Reactions    Dexamethasone Other (See Comments)     HR > 200      Latex     Penicillins Rash     Current Outpatient Medications   Medication Instructions    doxepin (SINEQUAN) 10 mg, Oral, Nightly    propranoloL (INDERAL) 20 mg, Oral, 2 times daily    sertraline (ZOLOFT) 50 mg, Oral, Daily    topiramate (TOPAMAX) 75 mg, Oral, 2 times daily     Medications Discontinued During This Encounter   Medication Reason    propranoloL (INDERAL) 20 MG tablet Reorder       Past Medical  "History:   Diagnosis Date    Asthma     Pott's disease      The patient has no Health Maintenance topics of status Not Due    There is no immunization history on file for this patient.    Objective     Body mass index is 44.82 kg/m².  Wt Readings from Last 3 Encounters:   11/21/22 114.8 kg (253 lb)   10/31/22 113.9 kg (251 lb)   09/29/22 111.1 kg (245 lb)     Ht Readings from Last 3 Encounters:   11/21/22 5' 3" (1.6 m)   10/31/22 5' 8" (1.727 m)   09/29/22 5' 8" (1.727 m)     BP Readings from Last 3 Encounters:   11/21/22 (!) 134/91   10/31/22 (!) 140/103   09/29/22 122/86     Temp Readings from Last 3 Encounters:   11/21/22 98.3 °F (36.8 °C)   10/31/22 99.1 °F (37.3 °C) (Oral)   09/09/22 98.2 °F (36.8 °C)     Pulse Readings from Last 3 Encounters:   11/21/22 86   10/31/22 87   09/29/22 70     Resp Readings from Last 3 Encounters:   11/21/22 18   10/31/22 18   09/29/22 18     PF Readings from Last 3 Encounters:   No data found for PF       Physical Exam  Vitals reviewed.   Constitutional:       General: She is not in acute distress.     Appearance: Normal appearance. She is not ill-appearing, toxic-appearing or diaphoretic.   HENT:      Head: Normocephalic and atraumatic.      Right Ear: External ear normal.      Left Ear: External ear normal.      Nose: Nose normal. No congestion or rhinorrhea.      Mouth/Throat:      Mouth: Mucous membranes are moist.      Pharynx: Oropharynx is clear. No oropharyngeal exudate or posterior oropharyngeal erythema.   Eyes:      General: No scleral icterus.        Right eye: No discharge.         Left eye: No discharge.      Conjunctiva/sclera: Conjunctivae normal.      Pupils: Pupils are equal, round, and reactive to light.   Cardiovascular:      Rate and Rhythm: Normal rate and regular rhythm.      Pulses: Normal pulses.   Pulmonary:      Effort: Pulmonary effort is normal. No respiratory distress.      Breath sounds: Normal breath sounds. No wheezing or rhonchi.   Musculoskeletal: "      Cervical back: Normal range of motion and neck supple. No tenderness.      Right lower leg: No edema.      Left lower leg: No edema.   Lymphadenopathy:      Cervical: No cervical adenopathy.   Skin:     General: Skin is warm.   Neurological:      General: No focal deficit present.      Mental Status: She is alert.   Psychiatric:         Behavior: Behavior normal.       Assessment and Plan     Problem List Items Addressed This Visit          Cardiac/Vascular    POTS (postural orthostatic tachycardia syndrome)    Relevant Medications    propranoloL (INDERAL) 20 MG tablet     Other Visit Diagnoses       Encounter for routine adult physical exam with abnormal findings    -  Primary    Screening for diabetes mellitus        Relevant Orders    Hemoglobin A1C    Glucose, Fasting    Screening for lipoid disorders        Relevant Orders    Lipid Panel              Plan: wellness labs as above. Patient desires to return to her ObGyn for pap smear (she will get them to fax results to us once complete). Will increase propranolol to twice daily to help with headaches and decrease blood pressure.       I have reviewed the medications, allergies, and problem list.     Goal Actions:    What type of visit is the patient here for today?: Healthy You (Simultaneous filing. User may not have seen previous data.)  Does the patient consent to enroll in [facility] Me Healthy?: Yes (Simultaneous filing. User may not have seen previous data.)  Is this a Wellness Follow Up?: No  What is your overall wellness goal? (select at least one): Improve overall health  Choose 3: Nutrition, Exercise, Tobacco (Simultaneous filing. User may not have seen previous data.)  Lifestyle Actions : Take medications as prescribed  Nurtrition Actions: Eat a well-balanced diet (Simultaneous filing. User may not have seen previous data.)  Exercise Actions: Recommend physical activity 30 minutes per day 3-5 times/week (Simultaneous filing. User may not have seen  previous data.)  Tobacco Actions: Avoid all tobacco products including 2nd and 3rd hand exposure    Zoey Driscoll MD  Family Medicine  11/21/2022

## 2022-11-23 LAB
EST. AVERAGE GLUCOSE BLD GHB EST-MCNC: 97 MG/DL
HBA1C MFR BLD HPLC: 5.5 % (ref 4.5–6.6)

## 2023-04-26 ENCOUNTER — OFFICE VISIT (OUTPATIENT)
Dept: PRIMARY CARE CLINIC | Facility: CLINIC | Age: 28
End: 2023-04-26
Payer: COMMERCIAL

## 2023-04-26 VITALS
SYSTOLIC BLOOD PRESSURE: 131 MMHG | RESPIRATION RATE: 18 BRPM | OXYGEN SATURATION: 98 % | HEART RATE: 88 BPM | WEIGHT: 256.38 LBS | DIASTOLIC BLOOD PRESSURE: 85 MMHG | BODY MASS INDEX: 45.43 KG/M2 | TEMPERATURE: 98 F | HEIGHT: 63 IN

## 2023-04-26 DIAGNOSIS — G54.0 THORACIC OUTLET SYNDROME: Primary | ICD-10-CM

## 2023-04-26 PROCEDURE — 3079F DIAST BP 80-89 MM HG: CPT | Mod: ,,, | Performed by: STUDENT IN AN ORGANIZED HEALTH CARE EDUCATION/TRAINING PROGRAM

## 2023-04-26 PROCEDURE — 3079F PR MOST RECENT DIASTOLIC BLOOD PRESSURE 80-89 MM HG: ICD-10-PCS | Mod: ,,, | Performed by: STUDENT IN AN ORGANIZED HEALTH CARE EDUCATION/TRAINING PROGRAM

## 2023-04-26 PROCEDURE — 3008F PR BODY MASS INDEX (BMI) DOCUMENTED: ICD-10-PCS | Mod: ,,, | Performed by: STUDENT IN AN ORGANIZED HEALTH CARE EDUCATION/TRAINING PROGRAM

## 2023-04-26 PROCEDURE — 1159F MED LIST DOCD IN RCRD: CPT | Mod: ,,, | Performed by: STUDENT IN AN ORGANIZED HEALTH CARE EDUCATION/TRAINING PROGRAM

## 2023-04-26 PROCEDURE — 3075F PR MOST RECENT SYSTOLIC BLOOD PRESS GE 130-139MM HG: ICD-10-PCS | Mod: ,,, | Performed by: STUDENT IN AN ORGANIZED HEALTH CARE EDUCATION/TRAINING PROGRAM

## 2023-04-26 PROCEDURE — 3075F SYST BP GE 130 - 139MM HG: CPT | Mod: ,,, | Performed by: STUDENT IN AN ORGANIZED HEALTH CARE EDUCATION/TRAINING PROGRAM

## 2023-04-26 PROCEDURE — 3008F BODY MASS INDEX DOCD: CPT | Mod: ,,, | Performed by: STUDENT IN AN ORGANIZED HEALTH CARE EDUCATION/TRAINING PROGRAM

## 2023-04-26 PROCEDURE — 1159F PR MEDICATION LIST DOCUMENTED IN MEDICAL RECORD: ICD-10-PCS | Mod: ,,, | Performed by: STUDENT IN AN ORGANIZED HEALTH CARE EDUCATION/TRAINING PROGRAM

## 2023-04-26 PROCEDURE — 99212 OFFICE O/P EST SF 10 MIN: CPT | Mod: ,,, | Performed by: STUDENT IN AN ORGANIZED HEALTH CARE EDUCATION/TRAINING PROGRAM

## 2023-04-26 PROCEDURE — 99212 PR OFFICE/OUTPT VISIT, EST, LEVL II, 10-19 MIN: ICD-10-PCS | Mod: ,,, | Performed by: STUDENT IN AN ORGANIZED HEALTH CARE EDUCATION/TRAINING PROGRAM

## 2023-04-26 NOTE — PROGRESS NOTES
Subjective:      Zora Becerra is a 27 y.o.female who presents to clinic for Referral    Patient states she was diagnosed with thoracic outlet syndrome on the right over 10 years ago with [facility]. She went to PT which improved her symptoms. About 3 years ago, she went back to Sports Medicine for re-evaluation, and thoracic outlet syndrome was confirmed with nerve study. She went back to PT and they noted she has been getting worse despite therapy. She has had an injection in her back for her pain, but it made her pain worse. 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 She has noticed an increase in interference with her life - mary carmen with holding a hairbrush or other personal activities. She called [facility] and they said they could evaluate/treat her.     ROS as above    Past Medical History:  has a past medical history of Asthma and Pott's disease.   Past Surgical History:  has a past surgical history that includes Cholecystectomy and Tonsillectomy.  Family History: family history includes Diabetes in her maternal grandfather, mother, and paternal grandfather.  Social History:  reports that she has never smoked. She has never used smokeless tobacco. She reports that she does not currently use alcohol. She reports that she does not use drugs.  Allergies:   Review of patient's allergies indicates:   Allergen Reactions    Dexamethasone Other (See Comments)     HR > 200      Latex     Penicillins Rash       Objective:     Vitals:    04/26/23 1538   BP: 131/85   Pulse: 88   Resp: 18   Temp: 98.1 °F (36.7 °C)     Physical Exam  Vitals reviewed.   Constitutional:       General: She is not in acute distress.     Appearance: Normal appearance. She is not ill-appearing.   HENT:      Head: Normocephalic and atraumatic.      Right Ear: External ear normal.      Left Ear: External ear normal.    Eyes:      General: No scleral icterus.        Right eye: No discharge.         Left eye: No discharge.      Conjunctiva/sclera: Conjunctivae normal.   Cardiovascular:      Rate and Rhythm: Normal rate and regular rhythm.      Pulses: Normal pulses.   Pulmonary:      Effort: Pulmonary effort is normal. No respiratory distress.      Breath sounds: Normal breath sounds. No wheezing.   Neurological:      General: No focal deficit present.      Mental Status: She is alert.      GCS: GCS eye subscore is 4. GCS verbal subscore is 5. GCS motor subscore is 6.      Comments: Decreased sensation on radial and ulnar aspect of right hand   strength intact bilaterally   Psychiatric:         Behavior: Behavior normal.          Assessment/Plan:     1. Thoracic outlet syndrome  - for further management per patient request  - Ambulatory referral/consult to Vascular Medicine; Future      Return to clinic as needed.     Zoey Driscoll MD  Family Medicine  04/26/2023

## 2023-07-21 ENCOUNTER — PATIENT MESSAGE (OUTPATIENT)
Dept: ADMINISTRATIVE | Facility: HOSPITAL | Age: 28
End: 2023-07-21

## 2023-09-21 ENCOUNTER — OFFICE VISIT (OUTPATIENT)
Dept: PRIMARY CARE CLINIC | Facility: CLINIC | Age: 28
End: 2023-09-21
Payer: COMMERCIAL

## 2023-09-21 VITALS
TEMPERATURE: 99 F | BODY MASS INDEX: 44.65 KG/M2 | DIASTOLIC BLOOD PRESSURE: 89 MMHG | RESPIRATION RATE: 18 BRPM | WEIGHT: 252 LBS | HEIGHT: 63 IN | OXYGEN SATURATION: 98 % | HEART RATE: 87 BPM | SYSTOLIC BLOOD PRESSURE: 120 MMHG

## 2023-09-21 DIAGNOSIS — R41.840 INATTENTION: Primary | ICD-10-CM

## 2023-09-21 PROCEDURE — 3074F PR MOST RECENT SYSTOLIC BLOOD PRESSURE < 130 MM HG: ICD-10-PCS | Mod: ,,, | Performed by: STUDENT IN AN ORGANIZED HEALTH CARE EDUCATION/TRAINING PROGRAM

## 2023-09-21 PROCEDURE — 1159F MED LIST DOCD IN RCRD: CPT | Mod: ,,, | Performed by: STUDENT IN AN ORGANIZED HEALTH CARE EDUCATION/TRAINING PROGRAM

## 2023-09-21 PROCEDURE — 3079F PR MOST RECENT DIASTOLIC BLOOD PRESSURE 80-89 MM HG: ICD-10-PCS | Mod: ,,, | Performed by: STUDENT IN AN ORGANIZED HEALTH CARE EDUCATION/TRAINING PROGRAM

## 2023-09-21 PROCEDURE — 99213 OFFICE O/P EST LOW 20 MIN: CPT | Mod: ,,, | Performed by: STUDENT IN AN ORGANIZED HEALTH CARE EDUCATION/TRAINING PROGRAM

## 2023-09-21 PROCEDURE — 3074F SYST BP LT 130 MM HG: CPT | Mod: ,,, | Performed by: STUDENT IN AN ORGANIZED HEALTH CARE EDUCATION/TRAINING PROGRAM

## 2023-09-21 PROCEDURE — 99213 PR OFFICE/OUTPT VISIT, EST, LEVL III, 20-29 MIN: ICD-10-PCS | Mod: ,,, | Performed by: STUDENT IN AN ORGANIZED HEALTH CARE EDUCATION/TRAINING PROGRAM

## 2023-09-21 PROCEDURE — 3008F PR BODY MASS INDEX (BMI) DOCUMENTED: ICD-10-PCS | Mod: ,,, | Performed by: STUDENT IN AN ORGANIZED HEALTH CARE EDUCATION/TRAINING PROGRAM

## 2023-09-21 PROCEDURE — 1160F PR REVIEW ALL MEDS BY PRESCRIBER/CLIN PHARMACIST DOCUMENTED: ICD-10-PCS | Mod: ,,, | Performed by: STUDENT IN AN ORGANIZED HEALTH CARE EDUCATION/TRAINING PROGRAM

## 2023-09-21 PROCEDURE — 3008F BODY MASS INDEX DOCD: CPT | Mod: ,,, | Performed by: STUDENT IN AN ORGANIZED HEALTH CARE EDUCATION/TRAINING PROGRAM

## 2023-09-21 PROCEDURE — 1159F PR MEDICATION LIST DOCUMENTED IN MEDICAL RECORD: ICD-10-PCS | Mod: ,,, | Performed by: STUDENT IN AN ORGANIZED HEALTH CARE EDUCATION/TRAINING PROGRAM

## 2023-09-21 PROCEDURE — 3079F DIAST BP 80-89 MM HG: CPT | Mod: ,,, | Performed by: STUDENT IN AN ORGANIZED HEALTH CARE EDUCATION/TRAINING PROGRAM

## 2023-09-21 PROCEDURE — 1160F RVW MEDS BY RX/DR IN RCRD: CPT | Mod: ,,, | Performed by: STUDENT IN AN ORGANIZED HEALTH CARE EDUCATION/TRAINING PROGRAM

## 2023-09-21 NOTE — PROGRESS NOTES
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 She said she can't focus on things at home. She is in the process of getting her Masters degree but she will worry about laundry, dishes, etc even though she knows she has to study/do her schoolwork. She can't finish one task at a time. She says she can't stay focused. She has a hard time grading her papers when she's reading what the kids are writing. Mind bounces around. She has always had problems with it - was sent to a sleep doctor for evaluation many years ago. It is now affecting her job the most.       ROS as above    Past Medical History:  has a past medical history of Asthma and Pott's disease.   Past Surgical History:  has a past surgical history that includes Cholecystectomy and Tonsillectomy.  Family History: family history includes Diabetes in her maternal grandfather, mother, and paternal grandfather.  Social History:  reports that she has never smoked. She has never used smokeless tobacco. She reports that she does not currently use alcohol. She reports that she does not use drugs.  Allergies:   Review of patient's allergies indicates:   Allergen Reactions    Dexamethasone Other (See Comments)     HR > 200      Latex     Penicillins Rash       Objective:     Vitals:    09/21/23 1530   BP: 120/89   Pulse: 87   Resp: 18   Temp: 98.7 °F (37.1 °C)     Physical Exam  Vitals reviewed.   Constitutional:       General: She is not in acute distress.     Appearance: Normal appearance. She is not ill-appearing, toxic-appearing or diaphoretic.   HENT:      Head: Normocephalic and atraumatic.   Eyes:      General: No scleral icterus.        Right eye: No discharge.         Left eye: No discharge.   Pulmonary:      Effort: Pulmonary effort is normal. No  respiratory distress.   Neurological:      General: No focal deficit present.      Mental Status: She is alert.   Psychiatric:         Behavior: Behavior normal.            Assessment/Plan:     1. Inattention  - ADHD screening tool positive today  - will refer to [facility] for Adult ADHD testing      Health Maintenance Due   Topic Date Due    Hepatitis C Screening  Never done    HIV Screening  Never done    Pap Smear  Never done    Influenza Vaccine (1) 09/01/2023     Face to face encounter time 15 minutes.    Non face to face encounter time 5 minutes.  Reviewing separate obtained history, counseling and educating the patient, family and/or other caregivers, ordering medications, tests or procedures; referring and communicating with other health care professionals; documenting clinical information in the electronic medical record; care coordination; independently interpreting results and communicating results to the patient, family, and/or caregivers.    Total time for visit  20 minutes    Return to clinic as needed.      Zoey Driscoll MD  Family Medicine  09/21/2023

## 2023-09-21 NOTE — PATIENT INSTRUCTIONS
[facility]  [address], [place] [address]  [phone]

## 2023-09-22 ENCOUNTER — PATIENT OUTREACH (OUTPATIENT)
Dept: ADMINISTRATIVE | Facility: HOSPITAL | Age: 28
End: 2023-09-22

## 2023-09-22 NOTE — PROGRESS NOTES
Reviewed measures for population health  [facility], [facility], and [facility] has been reviewed  Uploaded pt last colonoscopy done on 9/14/2018 in . Le was notified  Requesting pap smear from [facility] also with labs to see if any was done

## 2023-09-22 NOTE — PROGRESS NOTES
Population Health review of encounter with date of service 9/21/23.  Does not qualify for HY or [facility].  Has HY scheduled for 11/22/23.Kimberly

## 2023-09-22 NOTE — LETTER
AUTHORIZATION FOR RELEASE OF   CONFIDENTIAL INFORMATION    Dear [facility],    We are seeing Zora Becerra, date of birth 1995, in the clinic at [facility]. Zoey Driscoll MD is the patient's PCP. Zora Becerra has an outstanding lab/procedure at the time we reviewed her chart. In order to help keep her health information updated, she has authorized us to request the following medical record(s):        (  )  MAMMOGRAM                                      (  )  COLONOSCOPY      (X)  PAP SMEAR                                          (X)  OUTSIDE LAB RESULTS     (  )  DEXA SCAN                                          (  )  EYE EXAM            (  )  FOOT EXAM                                          (  )  ENTIRE RECORD     (  )  OUTSIDE IMMUNIZATIONS                 (  )  _______________         Please fax records to [facility] Coordinator, Abby Sheridan, [phone].     If you have any questions, please contact [phone].          Patient Name: Zora Becerra  : 1995  Patient Phone #: [phone]

## 2023-11-22 ENCOUNTER — OFFICE VISIT (OUTPATIENT)
Dept: PRIMARY CARE CLINIC | Facility: CLINIC | Age: 28
End: 2023-11-22
Payer: COMMERCIAL

## 2023-11-22 VITALS
HEIGHT: 63 IN | OXYGEN SATURATION: 97 % | RESPIRATION RATE: 18 BRPM | DIASTOLIC BLOOD PRESSURE: 77 MMHG | HEART RATE: 84 BPM | SYSTOLIC BLOOD PRESSURE: 113 MMHG | TEMPERATURE: 98 F | BODY MASS INDEX: 43.57 KG/M2 | WEIGHT: 245.88 LBS

## 2023-11-22 DIAGNOSIS — Z00.00 ENCOUNTER FOR GENERAL ADULT MEDICAL EXAMINATION WITHOUT ABNORMAL FINDINGS: Primary | ICD-10-CM

## 2023-11-22 DIAGNOSIS — F32.A ANXIETY AND DEPRESSION: ICD-10-CM

## 2023-11-22 DIAGNOSIS — F41.9 ANXIETY AND DEPRESSION: ICD-10-CM

## 2023-11-22 DIAGNOSIS — F90.9 ATTENTION DEFICIT HYPERACTIVITY DISORDER (ADHD), UNSPECIFIED ADHD TYPE: ICD-10-CM

## 2023-11-22 DIAGNOSIS — G43.909 MIGRAINE WITHOUT STATUS MIGRAINOSUS, NOT INTRACTABLE, UNSPECIFIED MIGRAINE TYPE: ICD-10-CM

## 2023-11-22 DIAGNOSIS — Z13.1 SCREENING FOR DIABETES MELLITUS (DM): ICD-10-CM

## 2023-11-22 LAB
EST. AVERAGE GLUCOSE BLD GHB EST-MCNC: 108 MG/DL
HBA1C MFR BLD HPLC: 5.4 % (ref 4.5–6.6)

## 2023-11-22 PROCEDURE — 3078F PR MOST RECENT DIASTOLIC BLOOD PRESSURE < 80 MM HG: ICD-10-PCS | Mod: ,,, | Performed by: STUDENT IN AN ORGANIZED HEALTH CARE EDUCATION/TRAINING PROGRAM

## 2023-11-22 PROCEDURE — 83036 HEMOGLOBIN GLYCOSYLATED A1C: CPT | Mod: ,,, | Performed by: CLINICAL MEDICAL LABORATORY

## 2023-11-22 PROCEDURE — 1159F MED LIST DOCD IN RCRD: CPT | Mod: ,,, | Performed by: STUDENT IN AN ORGANIZED HEALTH CARE EDUCATION/TRAINING PROGRAM

## 2023-11-22 PROCEDURE — 3008F PR BODY MASS INDEX (BMI) DOCUMENTED: ICD-10-PCS | Mod: ,,, | Performed by: STUDENT IN AN ORGANIZED HEALTH CARE EDUCATION/TRAINING PROGRAM

## 2023-11-22 PROCEDURE — 3008F BODY MASS INDEX DOCD: CPT | Mod: ,,, | Performed by: STUDENT IN AN ORGANIZED HEALTH CARE EDUCATION/TRAINING PROGRAM

## 2023-11-22 PROCEDURE — 1160F RVW MEDS BY RX/DR IN RCRD: CPT | Mod: ,,, | Performed by: STUDENT IN AN ORGANIZED HEALTH CARE EDUCATION/TRAINING PROGRAM

## 2023-11-22 PROCEDURE — 83036 HEMOGLOBIN A1C: ICD-10-PCS | Mod: ,,, | Performed by: CLINICAL MEDICAL LABORATORY

## 2023-11-22 PROCEDURE — 3074F PR MOST RECENT SYSTOLIC BLOOD PRESSURE < 130 MM HG: ICD-10-PCS | Mod: ,,, | Performed by: STUDENT IN AN ORGANIZED HEALTH CARE EDUCATION/TRAINING PROGRAM

## 2023-11-22 PROCEDURE — 1159F PR MEDICATION LIST DOCUMENTED IN MEDICAL RECORD: ICD-10-PCS | Mod: ,,, | Performed by: STUDENT IN AN ORGANIZED HEALTH CARE EDUCATION/TRAINING PROGRAM

## 2023-11-22 PROCEDURE — 1160F PR REVIEW ALL MEDS BY PRESCRIBER/CLIN PHARMACIST DOCUMENTED: ICD-10-PCS | Mod: ,,, | Performed by: STUDENT IN AN ORGANIZED HEALTH CARE EDUCATION/TRAINING PROGRAM

## 2023-11-22 PROCEDURE — 99395 PREV VISIT EST AGE 18-39: CPT | Mod: 25,,, | Performed by: STUDENT IN AN ORGANIZED HEALTH CARE EDUCATION/TRAINING PROGRAM

## 2023-11-22 PROCEDURE — 3074F SYST BP LT 130 MM HG: CPT | Mod: ,,, | Performed by: STUDENT IN AN ORGANIZED HEALTH CARE EDUCATION/TRAINING PROGRAM

## 2023-11-22 PROCEDURE — 99395 PR PREVENTIVE VISIT,EST,18-39: ICD-10-PCS | Mod: 25,,, | Performed by: STUDENT IN AN ORGANIZED HEALTH CARE EDUCATION/TRAINING PROGRAM

## 2023-11-22 PROCEDURE — 3078F DIAST BP <80 MM HG: CPT | Mod: ,,, | Performed by: STUDENT IN AN ORGANIZED HEALTH CARE EDUCATION/TRAINING PROGRAM

## 2023-11-22 RX ORDER — DEXTROAMPHETAMINE SACCHARATE, AMPHETAMINE ASPARTATE, DEXTROAMPHETAMINE SULFATE AND AMPHETAMINE SULFATE 5; 5; 5; 5 MG/1; MG/1; MG/1; MG/1
1 TABLET ORAL 2 TIMES DAILY
COMMUNITY
Start: 2023-11-16

## 2023-11-22 RX ORDER — CLONIDINE HYDROCHLORIDE 0.1 MG/1
0.05-0.1 TABLET ORAL NIGHTLY PRN
COMMUNITY
Start: 2023-11-20

## 2023-11-22 RX ORDER — TOPIRAMATE 25 MG/1
25 TABLET ORAL DAILY
Qty: 30 TABLET | Refills: 2 | Status: SHIPPED | OUTPATIENT
Start: 2023-11-22 | End: 2024-11-21

## 2023-11-22 RX ORDER — FLUOXETINE HYDROCHLORIDE 20 MG/1
20 CAPSULE ORAL DAILY
COMMUNITY

## 2023-11-22 NOTE — PROGRESS NOTES
Subjective     Patient ID: Zora Becerra is a 28 y.o. female.    Chief Complaint: Annual Exam    Immunizations:  Influenza vaccine: declines  Tetanus and diptheria booster: declines    Screenings:  Last cervical cancer screenin23 with Regina Musa - reportedly normal (females, ages 21-65)  PHQ-2 score: 0; currently on prozac and it controls her symptoms for the most part  Last dental exam: earlier this year  Last eye exam: many years ago  LMP: 23    Social History:  Tobacco use: denies  Alcohol use: denies  Illicit drug use: denies    Housing:  Patient does live in a safe neighborhood. Patient does feel safe at home.     Sexual History:  Sexually active: is sexually active with her boyfriend  Consistent condom use: does report consistent use  STI screening: declines    Diet/Exercise:  Patient's diet consists of: meats, fruits, vegetables, and dairy  Exercise: walks daily    She is concerned of migraine headaches coming back x 2 weeks. Reports daily headaches and it feels like her old migraines. She used to be on topamax and propranolol but stopped taking them because she thought it was for her POTS and high heart rates. She was recently started on prozac, adderall, and clonidine for anxiety/depression and ADHD respectively by [facility] clinic last month. Headaches are daily and usually occur in the morning.       Review of Systems   Constitutional:  Negative for chills and fever.   Eyes:  Negative for photophobia and visual disturbance.   Respiratory:  Positive for cough. Negative for shortness of breath.    Cardiovascular:  Negative for chest pain and palpitations.   Gastrointestinal:  Negative for nausea and vomiting.   Genitourinary:  Negative for dysuria and hematuria.   Allergic/Immunologic: Negative for environmental allergies and food allergies.   Neurological:  Negative for dizziness and headaches.       Tobacco Use: Low Risk  (2023)    Patient History     Smoking Tobacco Use: Never  "    Smokeless Tobacco Use: Never     Passive Exposure: Not on file     Review of patient's allergies indicates:   Allergen Reactions    Dexamethasone Other (See Comments)     HR > 200      Latex     Pineapple     Penicillins Rash     Current Outpatient Medications   Medication Instructions    cloNIDine (CATAPRES) 0.05-0.1 mg, Oral, Nightly PRN    dextroamphetamine-amphetamine (ADDERALL) 20 mg tablet 1 tablet, Oral, 2 times daily    FLUoxetine 20 mg, Oral, Daily    topiramate (TOPAMAX) 25 mg, Oral, Daily     There are no discontinued medications.    Past Medical History:   Diagnosis Date    Asthma     Pott's disease      The patient has no Health Maintenance topics of status Not Due    There is no immunization history on file for this patient.    Objective     Body mass index is 43.56 kg/m².  Wt Readings from Last 3 Encounters:   11/22/23 111.5 kg (245 lb 14.4 oz)   09/21/23 114.3 kg (252 lb)   04/26/23 116.3 kg (256 lb 6.4 oz)     Ht Readings from Last 3 Encounters:   11/22/23 5' 3" (1.6 m)   09/21/23 5' 3" (1.6 m)   04/26/23 5' 3" (1.6 m)     BP Readings from Last 3 Encounters:   11/22/23 113/77   09/21/23 120/89   04/26/23 131/85     Temp Readings from Last 3 Encounters:   11/22/23 98.3 °F (36.8 °C) (Oral)   09/21/23 98.7 °F (37.1 °C) (Oral)   04/26/23 98.1 °F (36.7 °C)     Pulse Readings from Last 3 Encounters:   11/22/23 84   09/21/23 87   04/26/23 88     Resp Readings from Last 3 Encounters:   11/22/23 18   09/21/23 18   04/26/23 18     PF Readings from Last 3 Encounters:   No data found for PF       Physical Exam  Vitals reviewed.   Constitutional:       General: She is not in acute distress.     Appearance: Normal appearance. She is not ill-appearing, toxic-appearing or diaphoretic.   HENT:      Head: Normocephalic and atraumatic.      Right Ear: Tympanic membrane, ear canal and external ear normal.      Left Ear: Tympanic membrane, ear canal and external ear normal.      Nose: Nose normal.      Mouth/Throat: "      Mouth: Mucous membranes are moist.      Pharynx: No oropharyngeal exudate or posterior oropharyngeal erythema.   Eyes:      General: No scleral icterus.        Right eye: No discharge.         Left eye: No discharge.      Conjunctiva/sclera: Conjunctivae normal.      Pupils: Pupils are equal, round, and reactive to light.   Cardiovascular:      Rate and Rhythm: Normal rate and regular rhythm.      Pulses: Normal pulses.      Heart sounds: Normal heart sounds. No murmur heard.  Pulmonary:      Effort: Pulmonary effort is normal. No respiratory distress.      Breath sounds: Normal breath sounds. No wheezing or rhonchi.   Musculoskeletal:      Cervical back: Neck supple.      Right lower leg: No edema.      Left lower leg: No edema.   Skin:     General: Skin is warm.   Neurological:      General: No focal deficit present.      Mental Status: She is alert.   Psychiatric:         Behavior: Behavior normal.         Assessment and Plan     Problem List Items Addressed This Visit          Neuro    Migraine without status migrainosus, not intractable    Relevant Medications    topiramate (TOPAMAX) 25 MG tablet       Psychiatric    Anxiety and depression    Relevant Medications    FLUoxetine 20 MG capsule    Attention deficit hyperactivity disorder (ADHD)    Relevant Medications    dextroamphetamine-amphetamine (ADDERALL) 20 mg tablet    cloNIDine (CATAPRES) 0.1 MG tablet     Other Visit Diagnoses       Encounter for general adult medical examination without abnormal findings    -  Primary    Relevant Orders    Hemoglobin A1C    Screening for diabetes mellitus (DM)        Relevant Orders    Hemoglobin A1C            Plan: Wellness labs as above. Declined flu vaccine. Had pap smear with Regina Musa 07/2023. Declines STI screening. Restart topamax at lower dose for migraine headaches ppx. Cont meds for ADHD and anxiety/depression per Psych.        I have reviewed the medications, allergies, and problem list.     Goal  Actions:    What type of visit is the patient here for today?: Healthy You  Does the patient consent to enroll in [facility] Healthy?: Yes  Is this a Wellness Follow Up?: No  What is your overall wellness goal? (select at least one): Improve overall health  Choose 3: Lifestyle, Nutrition, Exercise  Lifestyle Actions : Take medications as prescribed  Nurtrition Actions: Eat a well-balanced diet  Exercise Actions: Recommend physical activity 30 minutes per day 3-5 times/week    Zoey Cobian MD  Family Medicine  11/22/2023

## 2023-11-22 NOTE — PATIENT INSTRUCTIONS
"Patient Education       Diet and Health   The Basics   Written by the doctors and editors at [facility]   Why is it important to eat a healthy diet? -- It's important to eat a healthy diet because eating the right foods can keep you healthy now and later on in life.  Which foods are especially healthy? -- Foods that are especially healthy include:  Fruits and vegetables - Eating a diet with lots of fruits and vegetables can help prevent heart disease and strokes. It might also help prevent certain types of cancers. Try to eat fruits and vegetables at each meal and also for snacks. If you don't have fresh fruits and vegetables available, you can eat frozen or canned ones instead. Doctors recommend eating at least 2 1/2 servings of vegetables and 2 servings of fruits each day.  Foods with fiber - Eating foods with a lot of fiber can help prevent heart disease and strokes. If you have type 2 diabetes, it can also help control your blood sugar. Foods that have a lot of fiber include vegetables, fruits, beans, nuts, oatmeal, and whole grain breads and cereals. You can tell how much fiber is in a food by reading the nutrition label (figure 1). Doctors recommend eating 25 to 36 grams of fiber each day.  Foods with folate (also called folic acid) - Folate is a vitamin that is important for pregnant people, since it helps prevent certain birth defects. Anyone who could get pregnant should get at least 400 micrograms of folic acid daily, whether or not they are actively trying to get pregnant. Folate is found in many breakfast cereals, oranges, orange juice, and green leafy vegetables.  Foods with calcium and vitamin D - Babies, children, and adults need calcium and vitamin D to help keep their bones strong. Adults also need calcium and vitamin D to help prevent osteoporosis. Osteoporosis is a condition that causes bones to get "thin" and break more easily than usual. Different foods and drinks have calcium and vitamin D in " "them (figure 2). People who don't get enough calcium and vitamin D in their diet might need to take a supplement.  Foods with protein - Protein helps your muscles stay strong. Healthy foods with a lot of protein include chicken, fish, eggs, beans, nuts, and soy products. Red meat also has a lot of protein, but it also contains fats, which can be unhealthy.  Some experts recommend a "Mediterranean diet." This involves eating a lot of fruits, vegetables, nuts, whole grains, and olive oil. It also includes some fish, poultry, and dairy products, but not a lot of red meat. Eating this way can help your overall health, and might even lower your risk of having a stroke.  What foods should I avoid or limit? -- To eat a healthy diet, there are some things you should avoid or limit. They include:   Fats - There are different types of fats. Some types of fats are better for your body than others.  Trans fats are especially unhealthy. They are found in margarines, many fast foods, and some store-bought baked goods. Trans fats can raise your cholesterol level and your increase your chance of getting heart disease. You should avoid eating foods with these types of fats.  The type of "polyunsaturated" fats found in fish seems to be healthy and can reduce your chance of getting heart disease. Other polyunsaturated fats might also be good for your health. When you cook, it's best to use oils with healthier fats, such as olive oil and canola oil.  Sugar - To have a healthy diet, it's important to limit or avoid sugar, sweets, and refined grains. Refined grains are found in white bread, white rice, most forms of pasta, and most packaged "snack" foods. Whole grains, such as whole-wheat bread and brown rice, have more fiber and are better for your health.  Avoiding sugar-sweetened beverages, like soda and sports drinks, can also help improve your health.  Red meat - Studies have shown that eating a lot of red meat can increase your " "risk of certain health problems, including heart disease and cancer. You should limit the amount of red meat that you eat.  Can I drink alcohol as part of a healthy diet? -- People who drink a small amount of alcohol each day might have a lower chance of getting heart disease. But drinking alcohol can lead to problems. For example, it can raise a person's chances of getting liver disease and certain types of cancers. In women, even 1 drink a day can increase the risk of getting breast cancer.  Most doctors recommend that adult women not have more than 1 drink a day and that adult men not have more than 2 drinks a day. The limits are different because most women's bodies take longer to break down alcohol.  How many calories do I need each day? -- The number of calories you need each day depends on your weight, height, age, sex, and how active you are.  Your doctor or nurse can tell you how many calories you should eat each day. If you are trying to lose weight, you should eat fewer calories each day.  What if I have questions? -- If you have questions about which foods you should or should not eat, ask your doctor or nurse. The right diet for you will depend, in part, on your health and any medical conditions you have.  All topics are updated as new evidence becomes available and our peer review process is complete.  This topic retrieved from [employer] on: Sep 21, 2021.  Topic 43028 Version 20.0  Release: 29.4.2 - C29.263  © 2021 UpToDate, Inc. and/or its affiliates. All rights reserved.  figure 1: Nutrition label - fiber     This is an example of a nutrition label. To figure out how much fiber is in a food, look for the line that says "Dietary Fiber." It's also important to look at the serving size. This food has 7 grams of fiber in each serving, and each serving is 1 cup.  Graphic 21552 Version 7.0    figure 2: Foods and drinks with calcium and vitamin D     Foods rich in calcium include ice cream, soy milk, breads, " "kale, broccoli, milk, cheese, cottage cheese, almonds, yogurt, ready-to-eat cereals, beans, and tofu. Foods rich in vitamin D include milk, fortified plant-based "milks" (soy, almond), canned tuna fish, cod liver oil, yogurt, ready-to-eat-cereals, cooked salmon, canned sardines, mackerel, and eggs. Some of these foods are rich in both.  Graphic 71384 Version 4.0    Consumer Information Use and Disclaimer   This information is not specific medical advice and does not replace information you receive from your health care provider. This is only a brief summary of general information. It does NOT include all information about conditions, illnesses, injuries, tests, procedures, treatments, therapies, discharge instructions or life-style choices that may apply to you. You must talk with your health care provider for complete information about your health and treatment options. This information should not be used to decide whether or not to accept your health care provider's advice, instructions or recommendations. Only your health care provider has the knowledge and training to provide advice that is right for you. The use of this information is governed by the [employer] End User License Agreement, available at https://www.Hypori.Selerity/en/solutions/QuantumSphere/about/radha.The use of [employer] content is governed by the [employer] Terms of Use. ©2021 UpToDate, Inc. All rights reserved.  Copyright   © 2021 UpToDate, Inc. and/or its affiliates. All rights reserved.    "

## 2023-11-24 ENCOUNTER — PATIENT OUTREACH (OUTPATIENT)
Dept: ADMINISTRATIVE | Facility: HOSPITAL | Age: 28
End: 2023-11-24

## 2023-11-24 NOTE — PROGRESS NOTES
Population Health Chart Review & Patient Outreach Details      Further Action Needed If Patient Returns Outreach:            Updates Requested / Reviewed:     []  Care Everywhere    []     []  External Sources ([facility], [facility], [facility], etc.)    [] [facility]   [] [facility]   [] Other:    []  Care Team Updated   []  Removed  or Duplicate Orders   []  Immunization Reconciliation Completed / Queried    [] [place]   [] [place]   [] [place]   [] [place]      Health Maintenance Topics Addressed and Outreach Outcomes / Actions Taken:             Breast Cancer Screening []  Mammogram Order Placed    []  Mammogram Screening Scheduled    []  External Records Requested & Care Team Updated if Applicable    []  External Records Uploaded & Care Team Updated if Applicable    []  Pt Declined Scheduling Mammogram    []  Pt Will Schedule with External Provider / Order Routed & Care Team Updated if Applicable              Cervical Cancer Screening []  Pap Smear Scheduled in Primary Care or OBGYN    []  External Records Requested & Care Team Updated if Applicable       []  External Records Uploaded, Care Team Updated, & History Updated if Applicable    []  Patient Declined Scheduling Pap Smear    []  Patient Will Schedule with External Provider & Care Team Updated if Applicable                  Colorectal Cancer Screening []  Colonoscopy Case Request / Referral / Home Test Order Placed    []  External Records Requested & Care Team Updated if Applicable    []  External Records Uploaded, Care Team Updated, & History Updated if Applicable    []  Patient Declined Completing Colon Cancer Screening    []  Patient Will Schedule with External Provider & Care Team Updated if Applicable    []  Fit Kit Mailed (add the SmartPhrase under additional notes)    []  Reminded Patient to Complete Home Test                Diabetic Eye Exam []  Eye Exam Screening Order Placed    []  Eye Camera Scheduled or Optometry/Ophthalmology Referral  Placed    []  External Records Requested & Care Team Updated if Applicable    []  External Records Uploaded, Care Team Updated, & History Updated if Applicable    []  Patient Declined Scheduling Eye Exam    []  Patient Will Schedule with External Provider & Care Team Updated if Applicable             Blood Pressure Control []  Primary Care Follow Up Visit Scheduled     []  Remote Blood Pressure Reading Captured    []  Patient Declined Remote Reading or Scheduling Appt - Escalated to PCP    []  Patient Will Call Back or Send Portal Message with Reading                 HbA1c & Other Labs []  Overdue Lab(s) Ordered    []  Overdue Lab(s) Scheduled    []  External Records Uploaded & Care Team Updated if Applicable    []  Primary Care Follow Up Visit Scheduled     []  Reminded Patient to Complete A1c Home Test    []  Patient Declined Scheduling Labs or Will Call Back to Schedule    []  Patient Will Schedule with External Provider / Order Routed, & Care Team Updated if Applicable           Primary Care Appointment []  Primary Care Appt Scheduled    []  Patient Declined Scheduling or Will Call Back to Schedule    []  Pt Established with External Provider, Updated Care Team, & Informed Pt to Notify Payor if Applicable           Medication Adherence /    Statin Use []  Primary Care Appointment Scheduled    []  Patient Reminded to  Prescription    []  Patient Declined, Provider Notified if Needed    []  Sent Provider Message to Review to Evaluate Pt for Statin, Add Exclusion Dx Codes, Document   Exclusion in Problem List, Change Statin Intensity Level to Moderate or High Intensity if Applicable                Osteoporosis Screening []  Dexa Order Placed    []  Dexa Appointment Scheduled    []  External Records Requested & Care Team Updated    []  External Records Uploaded, Care Team Updated, & History Updated if Applicable    []  Patient Declined Scheduling Dexa or Will Call Back to Schedule    []  Patient Will Schedule  with External Provider / Order Routed & Care Team Updated if Applicable       Additional Notes:.  Post visit Population Health review of encounter with date of service  11/22/23 with [facility].  All required HY components in encounter.    Followup appt for: 5/22/2024 HY

## 2023-11-24 NOTE — LETTER
AUTHORIZATION FOR RELEASE OF   CONFIDENTIAL INFORMATION    Dear [facility],    We are seeing Zora Becerra, date of birth 1995, in the clinic at [facility]. Zoey Cobian MD is the patient's PCP. Zora Becerra has an outstanding lab/procedure at the time we reviewed her chart. In order to help keep her health information updated, she has authorized us to request the following medical record(s):        (  )  MAMMOGRAM                                      (  )  COLONOSCOPY      (X)  PAP SMEAR                                          (X)  OUTSIDE LAB RESULTS     (  )  DEXA SCAN                                          (  )  EYE EXAM            (  )  FOOT EXAM                                          (  )  ENTIRE RECORD     (  )  OUTSIDE IMMUNIZATIONS                 (  )  _______________         Please fax records to [facility] Coordinator, Abby Sheridan, [phone].     If you have any questions, please contact [phone].          Patient Name: Zora Becerra  : 1995  Patient Phone #: [phone]

## 2023-12-05 ENCOUNTER — PATIENT OUTREACH (OUTPATIENT)
Dept: ADMINISTRATIVE | Facility: HOSPITAL | Age: 28
End: 2023-12-05

## 2023-12-05 NOTE — PROGRESS NOTES
Requested pap smear from [facility]. They stated no record of treatment for the services requested. Pt needs to be scheduled to have pap smear completed to be compliant.

## 2024-10-03 DIAGNOSIS — R10.9 ABDOMINAL DISCOMFORT: Primary | ICD-10-CM

## 2024-10-15 ENCOUNTER — OFFICE VISIT (OUTPATIENT)
Dept: GASTROENTEROLOGY | Facility: CLINIC | Age: 29
End: 2024-10-15
Payer: COMMERCIAL

## 2024-10-15 ENCOUNTER — HOSPITAL ENCOUNTER (OUTPATIENT)
Dept: RADIOLOGY | Facility: HOSPITAL | Age: 29
Discharge: HOME | End: 2024-10-15
Payer: COMMERCIAL

## 2024-10-15 VITALS
OXYGEN SATURATION: 100 % | HEART RATE: 113 BPM | BODY MASS INDEX: 31.83 KG/M2 | SYSTOLIC BLOOD PRESSURE: 142 MMHG | HEIGHT: 68 IN | DIASTOLIC BLOOD PRESSURE: 99 MMHG | WEIGHT: 210 LBS

## 2024-10-15 DIAGNOSIS — R13.10 DYSPHAGIA, UNSPECIFIED TYPE: ICD-10-CM

## 2024-10-15 DIAGNOSIS — K21.9 GASTROESOPHAGEAL REFLUX DISEASE, UNSPECIFIED WHETHER ESOPHAGITIS PRESENT: ICD-10-CM

## 2024-10-15 DIAGNOSIS — R10.9 ABDOMINAL DISCOMFORT: ICD-10-CM

## 2024-10-15 DIAGNOSIS — R19.7 DIARRHEA, UNSPECIFIED TYPE: Primary | ICD-10-CM

## 2024-10-15 PROCEDURE — 74018 RADEX ABDOMEN 1 VIEW: CPT | Mod: 26,,, | Performed by: RADIOLOGY

## 2024-10-15 PROCEDURE — 3080F DIAST BP >= 90 MM HG: CPT | Mod: S$GLB,,,

## 2024-10-15 PROCEDURE — 3077F SYST BP >= 140 MM HG: CPT | Mod: S$GLB,,,

## 2024-10-15 PROCEDURE — 99999 PR PBB SHADOW E&M-EST. PATIENT-LVL IV: CPT | Mod: PBBFAC,,,

## 2024-10-15 PROCEDURE — 1159F MED LIST DOCD IN RCRD: CPT | Mod: S$GLB,,,

## 2024-10-15 PROCEDURE — 74018 RADEX ABDOMEN 1 VIEW: CPT | Mod: TC

## 2024-10-15 PROCEDURE — 99215 OFFICE O/P EST HI 40 MIN: CPT | Mod: S$GLB,,,

## 2024-10-15 PROCEDURE — 3008F BODY MASS INDEX DOCD: CPT | Mod: S$GLB,,,

## 2024-10-15 RX ORDER — GALCANEZUMAB 120 MG/ML
120 INJECTION, SOLUTION SUBCUTANEOUS
COMMUNITY
Start: 2024-09-26

## 2024-10-15 RX ORDER — UBROGEPANT 100 MG/1
100 TABLET ORAL
COMMUNITY
Start: 2024-09-26

## 2024-10-15 RX ORDER — DICYCLOMINE HYDROCHLORIDE 10 MG/1
10 CAPSULE ORAL
Qty: 120 CAPSULE | Refills: 6 | Status: SHIPPED | OUTPATIENT
Start: 2024-10-15

## 2024-10-15 NOTE — PROGRESS NOTES
CC:  Abdominal pain with nausea      HPI 29 y.o. white female presents today with a complaint of abdominal pain cramping with diarrhea.  Patient does state she is having some burning issues in her stomach she does take Prilosec as well and complains of difficulty swallowing at.  Patient state she had colonoscopy in EGD at the age of 15 [facility] [place] and we will attempt to obtain those records.  Patient state they found ulcers in her stomach she thinks.  They told her she had IBS at that time.  Patient denies any history of familial IBD.  Patient states that she is under a lot of stress at work the every a.m. she does wake up and have diarrhea and then the rest of the day she feels constipated.  Patient state she is taking some Metamucil fiber capsules daily however the bottle does state to take 2 to 3 times a day.  Instructed patient that she should be taking it as recs show any to change to something that she is willing to take daily.  Patient does admit to having severe abdominal cramping that she has to double over in her belly at times cause of the pain.  Patient denies any hematochezia or melena.  She did not she is having some nausea no vomiting in some difficulty swallowing.  She had EGD and colonoscopy when she was 15 years old  Labs reviewed no elevated liver enzymes  Medical records reviewed. Additional history supplemented by nursing.     Past Medical History:   Diagnosis Date    Asthma     Pott's disease          Past Surgical History:   Procedure Laterality Date    CHOLECYSTECTOMY      TONSILLECTOMY         Social History  Social History     Tobacco Use    Smoking status: Never    Smokeless tobacco: Never   Substance Use Topics    Alcohol use: Not Currently    Drug use: Never         Family History   Problem Relation Name Age of Onset    Diabetes Mother      Diabetes Maternal Grandfather      Diabetes Paternal Grandfather         Review of Systems  General ROS: negative for chills, fever  "or weight loss  Psychological ROS: negative for hallucination, depression or suicidal ideation  Ophthalmic ROS: negative for blurry vision, photophobia or eye pain  ENT ROS: negative for epistaxis, sore throat or rhinorrhea  Respiratory ROS: no cough, shortness of breath, or wheezing  Cardiovascular ROS: no chest pain or dyspnea on exertion  Gastrointestinal ROS: no abdominal pain, change in bowel habits, or black/ bloody stools    Physical Examination  BP (!) 142/99   Pulse (!) 113   Ht 5' 8" (1.727 m)   Wt 95.3 kg (210 lb)   SpO2 100%   BMI 31.93 kg/m²   General appearance: alert, cooperative, no distress  HENT: Normocephalic, atraumatic, neck symmetrical, no nasal discharge   Eyes: conjunctivae/corneas clear, PERRL, EOM's intact  Lungs: no labored breathing, symmetric chest wall expansion bilaterally  Heart: regular rate and rhythm without rub; no displacement of the PMI   Abdomen: soft, non-tender; bowel sounds normoactive; no organomegaly    Labs:  No results found for: "WBC", "HGB", "HCT", "MCV", "PLT"    CMP  No results found for: "NA", "K", "CL", "CO2", "GLU", "BUN", "CREATININE", "CALCIUM", "PROT", "ALBUMIN", "BILITOT", "ALKPHOS", "AST", "ALT", "ANIONGAP", "ESTGFRAFRICA", "EGFRNONAA"    Imaging:  CT Abdomen Pelvis With IV Contrast  Order: [id number]  Impression    IMPRESSION:    No convincing acute CT findings. Other/detailed findings as above.        Preliminary report was issued by [facility].    The CT exam was performed using one or more of the following dose  reduction techniques: Automated exposure control,  adjustment of the  mA and/or kV according to patient size, or use of iterative  reconstruction technique.  Narrative    CT ABDOMEN PELVIS W CONTRAST    Indication: abd pain    Comparison: None    Technique: Multiple axial tomographic images of the abdomen and pelvis  were obtained after the administration of 100 cc Isovue-370  intravenous contrast.      Findings:     Lung bases " clear.    No worrisome focal hepatic abnormality. Prior cholecystectomy. The  pancreas is grossly unremarkable. The spleen is grossly unremarkable.    The bilateral adrenal glands are grossly unremarkable. The bilateral  kidneys are grossly unremarkable. The urinary bladder is incompletely  distended. Probable corpus luteum cyst within the right ovary. Uterus  and left adnexa grossly unremarkable.    There is no convincing evidence of gastrointestinal obstruction or  acute appendicitis. Visualized vasculature grossly unremarkable.  Visualized osseous and surrounding soft tissue structures demonstrate  no acute abnormality.    Independently reviewed    Assessment: Zora Becerra 30 yo WF here with:  Abdominal discomfort/cramping  Diarrhea  Epigastric pain  Dysphagia    Plan:   EGD scheduled for dysphagia  Labs with stool studies and a KUB today  Bentyl 10 mg p.o. a.c. and HS for abdominal cramping  Obtain records from [facility] of [place] for previous EGD and colonoscopy  Follow-up in 3 months or sooner if needed, we will plan colonoscopy if stool studies come back for inflammatory markers    -Over 45 minutes total face to face time and >50% spent in counseling and coordination of care with documented content discussed     Carla Adams, FNP [facility]

## 2024-10-16 NOTE — PROGRESS NOTES
Large volume of stool retained in colon.   I recommend a bowel cleanse with a gatorade miralax prep: take 20 mg of dulcolax orally and then mix 238 grams or 14 capfuls of miralax in 64 oz of your favorite zero sugar gatorade and drink over a 4 hour period on a day when you will be at home  After your bowel cleanse, I recommend starting a daily fiber supplement with Citrucel or Fibercon (can purchase at your local pharmacy)  I recommend that you also use Miralax 17 grams daily-to-twice daily as needed to have a regular, soft BM without straining - you can adjust how often you need miralax, but start with daily dosing and go from there  I recommend drinking at least 60-80 ounces of water daily unless you have a medical condition that requires fluid restriction

## 2024-10-25 ENCOUNTER — TELEPHONE (OUTPATIENT)
Dept: GASTROENTEROLOGY | Facility: CLINIC | Age: 29
End: 2024-10-25
Payer: COMMERCIAL

## 2024-10-25 NOTE — TELEPHONE ENCOUNTER
----- Message from Madison Coughlin NP sent at 10/25/2024  8:55 AM CDT -----  All stool test are normal

## 2024-10-28 ENCOUNTER — TELEPHONE (OUTPATIENT)
Dept: GASTROENTEROLOGY | Facility: CLINIC | Age: 29
End: 2024-10-28
Payer: COMMERCIAL

## 2025-01-16 ENCOUNTER — OFFICE VISIT (OUTPATIENT)
Dept: GASTROENTEROLOGY | Facility: CLINIC | Age: 30
End: 2025-01-16
Payer: COMMERCIAL

## 2025-01-16 VITALS
DIASTOLIC BLOOD PRESSURE: 94 MMHG | HEART RATE: 84 BPM | WEIGHT: 211 LBS | HEIGHT: 68 IN | BODY MASS INDEX: 31.98 KG/M2 | OXYGEN SATURATION: 99 % | SYSTOLIC BLOOD PRESSURE: 143 MMHG

## 2025-01-16 DIAGNOSIS — R10.13 EPIGASTRIC PAIN: ICD-10-CM

## 2025-01-16 DIAGNOSIS — K21.9 GASTROESOPHAGEAL REFLUX DISEASE, UNSPECIFIED WHETHER ESOPHAGITIS PRESENT: Primary | ICD-10-CM

## 2025-01-16 DIAGNOSIS — K58.1 IRRITABLE BOWEL SYNDROME WITH CONSTIPATION: ICD-10-CM

## 2025-01-16 PROCEDURE — 3008F BODY MASS INDEX DOCD: CPT | Mod: S$GLB,,,

## 2025-01-16 PROCEDURE — 4010F ACE/ARB THERAPY RXD/TAKEN: CPT | Mod: S$GLB,,,

## 2025-01-16 PROCEDURE — 1160F RVW MEDS BY RX/DR IN RCRD: CPT | Mod: S$GLB,,,

## 2025-01-16 PROCEDURE — 99999 PR PBB SHADOW E&M-EST. PATIENT-LVL IV: CPT | Mod: PBBFAC,,,

## 2025-01-16 PROCEDURE — 99214 OFFICE O/P EST MOD 30 MIN: CPT | Mod: S$GLB,,,

## 2025-01-16 PROCEDURE — 1159F MED LIST DOCD IN RCRD: CPT | Mod: S$GLB,,,

## 2025-01-16 PROCEDURE — 3077F SYST BP >= 140 MM HG: CPT | Mod: S$GLB,,,

## 2025-01-16 PROCEDURE — 3080F DIAST BP >= 90 MM HG: CPT | Mod: S$GLB,,,

## 2025-01-16 RX ORDER — PANTOPRAZOLE SODIUM 40 MG/1
40 TABLET, DELAYED RELEASE ORAL DAILY
Qty: 90 TABLET | Refills: 3 | Status: SHIPPED | OUTPATIENT
Start: 2025-01-16 | End: 2026-01-16

## 2025-01-16 RX ORDER — LISINOPRIL 5 MG/1
10 TABLET ORAL DAILY
COMMUNITY
Start: 2025-01-15

## 2025-01-16 NOTE — PROGRESS NOTES
CC:  IBS constipation    HPI 29 y.o. white female presents today with a complaint of continued constipation.  Patient is taking fiber gummies but does not believe they are working.  She is having some abdominal cramping steel states that she will have a cramps and then unable to have a bowel movement.  Patient does have a prescription for Bentyl instructed patient that Bentyl can cause more constipation so try and cut back on taking Bentyl if she is no longer having the diarrhea.  Discussed with the patient that we could try to as a Linzess patient does not want to do that at this time.  Patient is willing to try a new fiber supplement along with MiraLax daily to see if this will help with her bowel movements.  Patient denies any hematochezia or melena.  Denies any nausea vomiting or dysphagia.  Patient does admit to having GERD specially in the evening discuss using Protonix 40 mg 30 minutes before her last meal of the evening and patient stated that she has had before in the emergency room and this did help with her GERD symptoms.  Interval  HPI 29 y.o. white female presents today with a complaint of abdominal pain cramping with diarrhea.  Patient does state she is having some burning issues in her stomach she does take Prilosec as well and complains of difficulty swallowing at.  Patient state she had colonoscopy in EGD at the age of 15 at [facility] in [place] and we will attempt to obtain those records.  Patient state they found ulcers in her stomach she thinks.  They told her she had IBS at that time.  Patient denies any history of familial IBD.  Patient states that she is under a lot of stress at work the every a.m. she does wake up and have diarrhea and then the rest of the day she feels constipated.  Patient state she is taking some Metamucil fiber capsules daily however the bottle does state to take 2 to 3 times a day.  Instructed patient that she should be taking it as recs show any to change to  "something that she is willing to take daily.  Patient does admit to having severe abdominal cramping that she has to double over in her belly at times cause of the pain.  Patient denies any hematochezia or melena.  She did not she is having some nausea no vomiting in some difficulty swallowing.  She had EGD and colonoscopy when she was 15 years old   Medical records reviewed. Additional history supplemented by nursing.     Past Medical History:   Diagnosis Date    Asthma     Pott's disease          Review of Systems  General ROS: negative for chills, fever or weight loss  Cardiovascular ROS: no chest pain or dyspnea on exertion  Gastrointestinal ROS: no abdominal pain, +abdominal cramping, change in bowel habits, or black/ bloody stools    Physical Examination  BP (!) 143/94 Comment: Patient reports taking lisinopril this AM  Pulse 84   Ht 5' 8" (1.727 m)   Wt 95.7 kg (211 lb)   SpO2 99%   BMI 32.08 kg/m²   General appearance: alert, cooperative, no distress  HENT: Normocephalic, atraumatic, neck symmetrical, no nasal discharge   Lungs: no labored breathing, symmetric chest wall expansion bilaterally  Heart: regular rate and rhythm without rub; no displacement of the PMI   Abdomen: soft, non-tender; bowel sounds normoactive; no organomegaly    Labs:  No results found for: "WBC", "HGB", "HCT", "MCV", "PLT"    CMP  No results found for: "NA", "K", "CL", "CO2", "GLU", "BUN", "CREATININE", "CALCIUM", "PROT", "ALBUMIN", "BILITOT", "ALKPHOS", "AST", "ALT", "ANIONGAP", "ESTGFRAFRICA", "EGFRNONAA"    Imaging:  No pertinent imaging    Independently reviewed    Assessment: Zora Becerra 30 yo WF here with:  IBS constipation  GERD    Plan:  Begins taking Protonix 40 mg 30 minutes before last meal of the evening as this is when patient's GERD symptoms seem to be the worst  Keep scheduled EGD 02/13/2025 7:00 a.m.  -I recommend a bowel cleanse with a gatorade miralax prep: take 20 mg (4) of dulcolax orally and then mix 238 " grams (14 capfuls) of miralax in 64 oz of your favorite zero sugar gatorade and drink over a 4 hour period on a day when you will be at home  -After your bowel cleanse, I recommend starting a daily fiber supplement with Metamucil,Citrucel, fiber gummies or Fibercon (can purchase at your local pharmacy)  -I recommend that you also use Miralax 17 grams daily-to-twice daily to have a regular, soft BM without straining - you can adjust how often you need miralax, but start with daily dosing and go from there  -I recommend drinking at least 60-80 ounces of water daily unless you have a medical condition that requires fluid restriction   Follow-up in 3 months or sooner    30 minutes of total time spent on the encounter, which includes face to face time and non-face to face time preparing to see the patient (eg, review of tests), obtaining and/or reviewing separately obtained history, documenting clinical information in the electronic or other health record, Independently interpreting results (not separately reported) and communicating results to the patient/family/caregiver, or care coordination (not separately reported).         Madison Coughlin, MARYANNP  [facility]

## 2025-01-16 NOTE — PATIENT INSTRUCTIONS
Constipation     -I recommend a bowel cleanse with a gatorade miralax prep: take 20 mg (4) of dulcolax orally and then mix 238 grams (14 capfuls) of miralax in 64 oz of your favorite zero sugar gatorade and drink over a 4 hour period on a day when you will be at home    -After your bowel cleanse, I recommend starting a daily fiber supplement with Metamucil,Citrucel, fiber gummies or Fibercon (can purchase at your local pharmacy)    -I recommend that you also use Miralax 17 grams daily-to-twice daily to have a regular, soft BM without straining - you can adjust how often you need miralax, but start with daily dosing and go from there    -I recommend drinking at least 60-80 ounces of water daily unless you have a medical condition that requires fluid restriction

## 2025-01-31 ENCOUNTER — PATIENT MESSAGE (OUTPATIENT)
Dept: GASTROENTEROLOGY | Facility: CLINIC | Age: 30
End: 2025-01-31
Payer: COMMERCIAL

## 2025-02-13 ENCOUNTER — ANESTHESIA EVENT (OUTPATIENT)
Dept: GASTROENTEROLOGY | Facility: HOSPITAL | Age: 30
End: 2025-02-13
Payer: COMMERCIAL

## 2025-02-13 ENCOUNTER — HOSPITAL ENCOUNTER (OUTPATIENT)
Dept: GASTROENTEROLOGY | Facility: HOSPITAL | Age: 30
Discharge: HOME | End: 2025-02-13
Admitting: INTERNAL MEDICINE
Payer: COMMERCIAL

## 2025-02-13 ENCOUNTER — ANESTHESIA (OUTPATIENT)
Dept: GASTROENTEROLOGY | Facility: HOSPITAL | Age: 30
End: 2025-02-13
Payer: COMMERCIAL

## 2025-02-13 VITALS
HEIGHT: 67 IN | TEMPERATURE: 97 F | HEART RATE: 85 BPM | OXYGEN SATURATION: 99 % | BODY MASS INDEX: 32.18 KG/M2 | RESPIRATION RATE: 16 BRPM | WEIGHT: 205 LBS | SYSTOLIC BLOOD PRESSURE: 125 MMHG | DIASTOLIC BLOOD PRESSURE: 87 MMHG

## 2025-02-13 VITALS
TEMPERATURE: 97 F | DIASTOLIC BLOOD PRESSURE: 89 MMHG | SYSTOLIC BLOOD PRESSURE: 126 MMHG | HEART RATE: 112 BPM | OXYGEN SATURATION: 98 %

## 2025-02-13 DIAGNOSIS — R10.13 EPIGASTRIC PAIN: ICD-10-CM

## 2025-02-13 DIAGNOSIS — R13.10 DYSPHAGIA, UNSPECIFIED TYPE: Primary | ICD-10-CM

## 2025-02-13 DIAGNOSIS — K21.9 GASTROESOPHAGEAL REFLUX DISEASE, UNSPECIFIED WHETHER ESOPHAGITIS PRESENT: ICD-10-CM

## 2025-02-13 DIAGNOSIS — R13.10 DYSPHAGIA, UNSPECIFIED TYPE: ICD-10-CM

## 2025-02-13 LAB — POC URINE HCG: NEGATIVE

## 2025-02-13 PROCEDURE — 25000003 PHARM REV CODE 250: Performed by: NURSE ANESTHETIST, CERTIFIED REGISTERED

## 2025-02-13 PROCEDURE — 27000716 HC OXISENSOR PROBE, ANY SIZE: Performed by: NURSE ANESTHETIST, CERTIFIED REGISTERED

## 2025-02-13 PROCEDURE — 88342 IMHCHEM/IMCYTCHM 1ST ANTB: CPT | Mod: TC,SUR | Performed by: INTERNAL MEDICINE

## 2025-02-13 PROCEDURE — 88305 TISSUE EXAM BY PATHOLOGIST: CPT | Mod: 26,,, | Performed by: PATHOLOGY

## 2025-02-13 PROCEDURE — 27201423 OPTIME MED/SURG SUP & DEVICES STERILE SUPPLY

## 2025-02-13 PROCEDURE — 37000008 HC ANESTHESIA 1ST 15 MINUTES

## 2025-02-13 PROCEDURE — 37000009 HC ANESTHESIA EA ADD 15 MINS

## 2025-02-13 PROCEDURE — 27000284 HC CANNULA NASAL: Performed by: NURSE ANESTHETIST, CERTIFIED REGISTERED

## 2025-02-13 PROCEDURE — 63600175 PHARM REV CODE 636 W HCPCS: Performed by: NURSE ANESTHETIST, CERTIFIED REGISTERED

## 2025-02-13 RX ORDER — SUCRALFATE 1 G/1
1 TABLET ORAL 4 TIMES DAILY
Qty: 28 TABLET | Refills: 0 | Status: SHIPPED | OUTPATIENT
Start: 2025-02-13 | End: 2025-02-20

## 2025-02-13 RX ORDER — PROPOFOL 10 MG/ML
VIAL (ML) INTRAVENOUS
Status: DISCONTINUED | OUTPATIENT
Start: 2025-02-13 | End: 2025-02-13

## 2025-02-13 RX ORDER — SODIUM CHLORIDE 0.9 % (FLUSH) 0.9 %
10 SYRINGE (ML) INJECTION EVERY 6 HOURS PRN
Status: DISCONTINUED | OUTPATIENT
Start: 2025-02-13 | End: 2025-02-14 | Stop reason: HOSPADM

## 2025-02-13 RX ORDER — LIDOCAINE HYDROCHLORIDE 20 MG/ML
INJECTION, SOLUTION EPIDURAL; INFILTRATION; INTRACAUDAL; PERINEURAL
Status: DISCONTINUED | OUTPATIENT
Start: 2025-02-13 | End: 2025-02-13

## 2025-02-13 RX ORDER — SODIUM CHLORIDE, SODIUM LACTATE, POTASSIUM CHLORIDE, CALCIUM CHLORIDE 600; 310; 30; 20 MG/100ML; MG/100ML; MG/100ML; MG/100ML
INJECTION, SOLUTION INTRAVENOUS CONTINUOUS
Status: DISCONTINUED | OUTPATIENT
Start: 2025-02-13 | End: 2025-02-14 | Stop reason: HOSPADM

## 2025-02-13 RX ADMIN — SODIUM CHLORIDE: 9 INJECTION, SOLUTION INTRAVENOUS at 08:02

## 2025-02-13 RX ADMIN — LIDOCAINE HYDROCHLORIDE 75 MG: 20 INJECTION, SOLUTION EPIDURAL; INFILTRATION; INTRACAUDAL; PERINEURAL at 08:02

## 2025-02-13 RX ADMIN — PROPOFOL 100 MG: 10 INJECTION, EMULSION INTRAVENOUS at 08:02

## 2025-02-13 RX ADMIN — PROPOFOL 125 MG: 10 INJECTION, EMULSION INTRAVENOUS at 08:02

## 2025-02-13 RX ADMIN — PROPOFOL 75 MG: 10 INJECTION, EMULSION INTRAVENOUS at 08:02

## 2025-02-13 RX ADMIN — PROPOFOL 50 MG: 10 INJECTION, EMULSION INTRAVENOUS at 08:02

## 2025-02-13 NOTE — ANESTHESIA PREPROCEDURE EVALUATION
02/13/2025  Zora Becerra is a 29 y.o., female.    Past Medical History:   Diagnosis Date    Asthma     Pott's disease        Past Surgical History:   Procedure Laterality Date    CHOLECYSTECTOMY      TONSILLECTOMY         Family History   Problem Relation Name Age of Onset    Diabetes Mother      Diabetes Maternal Grandfather      Diabetes Paternal Grandfather         Social History     Socioeconomic History    Marital status: Single    Number of children: 0   Tobacco Use    Smoking status: Never    Smokeless tobacco: Never   Substance and Sexual Activity    Alcohol use: Not Currently    Drug use: Never    Sexual activity: Yes     Partners: Male     Birth control/protection: Condom       Current Outpatient Medications   Medication Sig Dispense Refill    cloNIDine (CATAPRES) 0.1 MG tablet Take 0.05-0.1 mg by mouth nightly as needed.      dextroamphetamine-amphetamine (ADDERALL) 20 mg tablet Take 1 tablet by mouth 2 (two) times daily.      dicyclomine (BENTYL) 10 MG capsule Take 1 capsule (10 mg total) by mouth 4 (four) times daily before meals and nightly. 120 capsule 6    EMGALITY  mg/mL PnIj Inject 120 mg into the skin every 28 days.      FLUoxetine 20 MG capsule Take 20 mg by mouth once daily.      lisinopriL (PRINIVIL,ZESTRIL) 5 MG tablet Take 10 mg by mouth once daily.      pantoprazole (PROTONIX) 40 MG tablet Take 1 tablet (40 mg total) by mouth once daily. Take 30 mins before last meal of the evening 90 tablet 3    UBRELVY 100 mg tablet Take 100 mg by mouth as needed.       No current facility-administered medications for this encounter.       Review of patient's allergies indicates:   Allergen Reactions    Dexamethasone Other (See Comments)     HR > 200      Latex     Pineapple     Penicillins Rash      Pre-op Assessment    I have reviewed the Patient Summary Reports.     I have reviewed the  Nursing Notes. I have reviewed the NPO Status.   I have reviewed the Medications.     Review of Systems  Anesthesia Hx:  No problems with previous Anesthesia             Denies Family Hx of Anesthesia complications.    Denies Personal Hx of Anesthesia complications.                    Hematology/Oncology:    Oncology Normal                                   EENT/Dental:  EENT/Dental Normal           Pulmonary:    Asthma                    Renal/:  Renal/ Normal                 Hepatic/GI:     GERD                Musculoskeletal:  Musculoskeletal Normal                Neurological:      Headaches                                 Endocrine:  Diabetes, type 2         Obesity / BMI > 30  Dermatological:  Skin Normal    Psych:  Psychiatric History anxiety depression                Physical Exam  General: Well nourished, Alert, Oriented and Cooperative    Airway:  Mouth Opening: Normal  Neck ROM: Normal ROM    Chest/Lungs:  Normal Respiratory Rate    Heart:  Rate: Normal        Anesthesia Plan  Type of Anesthesia, risks & benefits discussed:    Anesthesia Type: Gen Natural Airway, MAC  Intra-op Monitoring Plan: Standard ASA Monitors  Post Op Pain Control Plan: multimodal analgesia and IV/PO Opioids PRN  Induction:  IV  Informed Consent: Informed consent signed with the Patient and all parties understand the risks and agree with anesthesia plan.  All questions answered. Patient consented to blood products? Yes  ASA Score: 3  Day of Surgery Review of History & Physical: I have interviewed and examined the patient. I have reviewed the patient's H&P dated: There are no significant changes.     Ready For Surgery From Anesthesia Perspective.     .

## 2025-02-13 NOTE — DISCHARGE INSTRUCTIONS
Procedure Date  2/13/25     Impression  Overall Impression:   The esophagus appeared normal.  Performed forceps biopsies in the middle third of the esophagus to rule out eosinophilic esophagitis  Dilated with Savary-Robles dilator  Mild erythematous mucosa in the antrum; performed cold forceps biopsy to rule out H. pylori  The duodenal bulb and 2nd part of the duodenum appeared normal.  Performed forceps biopsies in the duodenum to rule out celiac disease        Recommendation  - Normal endoscopic findings except mild gastritis; biopsies taken to rule out EoE, h.pylori and celiac disease  - Empiric dilation performed due to symptoms of dysphagia  - Dietary modification and consider switch of therapy to Voquezna if no improvement in heartburn and reflux symptoms on current maximum dose PPI therapy  - Discharge patient to home  - Advance diet as tolerated  - Continue present medications  - Await pathology results  - Patient has a contact number available for emergencies. The signs and symptoms of potential delayed complications were discussed with the patient. Return to normal activities tomorrow. Written discharge instructions were provided to the patient.     Indication  Dysphagia, unspecified type, Gastroesophageal reflux disease, unspecified whether esophagitis present

## 2025-02-13 NOTE — H&P
History & Physical - Short Stay  Gastroenterology      SUBJECTIVE:     Procedure: EGD    Chief Complaint/Indication for Procedure: Abdominal Pain, Dysphagia, and Reflux    (Not in a hospital admission)      Review of patient's allergies indicates:   Allergen Reactions    Dexamethasone Other (See Comments)     HR > 200      Latex     Pineapple     Penicillins Rash        Past Medical History:   Diagnosis Date    Asthma     Digestive disorder     Pott's disease     Seizures      Past Surgical History:   Procedure Laterality Date    CHOLECYSTECTOMY      TONSILLECTOMY       Family History   Problem Relation Name Age of Onset    Diabetes Mother      Diabetes Maternal Grandfather      Diabetes Paternal Grandfather       Social History     Tobacco Use    Smoking status: Never    Smokeless tobacco: Never   Substance Use Topics    Alcohol use: Not Currently    Drug use: Never         OBJECTIVE:     Vital Signs (Most Recent)  Temp: 98.2 °F (36.8 °C) (02/13/25 0753)  Pulse: 96 (02/13/25 0753)  Resp: (!) 22 (02/13/25 0753)  BP: (!) 135/94 (02/13/25 0753)  SpO2: 100 % (02/13/25 0753)    Physical Exam:                                                       General appearance: alert, cooperative, no distress, comfortable appearing  HENT: Normocephalic, atraumatic, neck symmetrical  Eyes: conjunctivae clear  Lungs: No labored breathing  Abd: Soft, non-tender    ASSESSMENT/PLAN:     Assessment: Abdominal Pain, Dysphagia, and Reflux    Plan: EGD

## 2025-02-13 NOTE — TELEPHONE ENCOUNTER
Pt with call of issues w/ pain in upper ab/chest and of difficulty/pain w/ swallowing following scope - pt advised that these issues are normal which probably stems from the procedure itself and the anesthesia administered - to try Simethicone and see if the issue gets better - spoke w/ provider whom advised that pt takes it easy, try soft foods, and new rx for Carafate qid x7 days sent into pharmacy but if pt cont to have discomfort and or increased in discomfort to visit the ED - to see how interventions work and to call if issues persist and we will get back in for a clinic visit - med therapy/interventions reviewed w/ pt w/ good vu noted

## 2025-02-13 NOTE — TRANSFER OF CARE
"Anesthesia Transfer of Care Note    Patient: Zora Becerra    Procedure(s) Performed: * No procedures listed *    Patient location: GI    Anesthesia Type: MAC    Transport from OR: Transported from OR on room air with adequate spontaneous ventilation. Continuous ECG monitoring in transport. Continuous SpO2 monitoring in transport    Post pain: adequate analgesia    Post assessment: no apparent anesthetic complications    Post vital signs: stable    Level of consciousness: sedated and responds to stimulation    Nausea/Vomiting: no nausea/vomiting    Complications: none    Transfer of care protocol was followedComments: Good SV continue, NAD, VSS, RTRN      Last vitals: Visit Vitals  /89 (BP Location: Left arm, Patient Position: Lying)   Pulse (!) 115   Temp 36.1 °C (97 °F) (Skin)   Resp 10   Ht 5' 7" (1.702 m)   Wt 93 kg (205 lb)   SpO2 97%   Breastfeeding No   BMI 32.11 kg/m²     "

## 2025-02-13 NOTE — ANESTHESIA POSTPROCEDURE EVALUATION
Anesthesia Post Evaluation    Patient: Zora Becerra    Procedure(s) Performed: * No procedures listed *    Final Anesthesia Type: MAC      Patient location during evaluation: GI PACU  Patient participation: Yes- Able to Participate  Level of consciousness: awake and alert  Post-procedure vital signs: reviewed and stable  Pain management: adequate  Airway patency: patent    PONV status at discharge: No PONV  Anesthetic complications: no      Cardiovascular status: blood pressure returned to baseline and hemodynamically stable  Respiratory status: spontaneous ventilation  Hydration status: euvolemic  Follow-up not needed.  Comments: Refer to nursing notes for pain/genie score upon discharge from recovery.              Vitals Value Taken Time   /87 02/13/25 0905   Temp 36.1 °C (97 °F) 02/13/25 0829   Pulse 85 02/13/25 0905   Resp 16 02/13/25 0905   SpO2 99 % 02/13/25 0905         Event Time   Out of Recovery 09:25:32         Pain/Genie Score: Genie Score: 10 (2/13/2025  8:57 AM)

## 2025-02-14 LAB
ESTROGEN SERPL-MCNC: NORMAL PG/ML
INSULIN SERPL-ACNC: NORMAL U[IU]/ML
LAB AP GROSS DESCRIPTION: NORMAL
LAB AP LABORATORY NOTES: NORMAL
T3RU NFR SERPL: NORMAL %

## 2025-02-27 ENCOUNTER — RESULTS FOLLOW-UP (OUTPATIENT)
Dept: GASTROENTEROLOGY | Facility: HOSPITAL | Age: 30
End: 2025-02-27

## 2025-07-11 ENCOUNTER — PATIENT MESSAGE (OUTPATIENT)
Dept: GASTROENTEROLOGY | Facility: CLINIC | Age: 30
End: 2025-07-11
Payer: COMMERCIAL